# Patient Record
Sex: MALE | Race: WHITE | URBAN - METROPOLITAN AREA
[De-identification: names, ages, dates, MRNs, and addresses within clinical notes are randomized per-mention and may not be internally consistent; named-entity substitution may affect disease eponyms.]

---

## 2017-08-28 ENCOUNTER — APPOINTMENT (OUTPATIENT)
Dept: OTHER | Facility: HOSPITAL | Age: 22
End: 2017-08-28
Attending: FAMILY MEDICINE

## 2024-05-10 ENCOUNTER — OFFICE VISIT (OUTPATIENT)
Dept: INTERNAL MEDICINE CLINIC | Facility: CLINIC | Age: 29
End: 2024-05-10

## 2024-05-10 VITALS
DIASTOLIC BLOOD PRESSURE: 86 MMHG | SYSTOLIC BLOOD PRESSURE: 128 MMHG | WEIGHT: 225 LBS | RESPIRATION RATE: 18 BRPM | OXYGEN SATURATION: 98 % | HEART RATE: 76 BPM | TEMPERATURE: 98 F | BODY MASS INDEX: 32.21 KG/M2 | HEIGHT: 70 IN

## 2024-05-10 DIAGNOSIS — M25.532 LEFT WRIST PAIN: ICD-10-CM

## 2024-05-10 DIAGNOSIS — Z00.00 ANNUAL PHYSICAL EXAM: Primary | ICD-10-CM

## 2024-05-10 DIAGNOSIS — K64.9 HEMORRHOIDS, UNSPECIFIED HEMORRHOID TYPE: ICD-10-CM

## 2024-05-10 DIAGNOSIS — Z30.09 VASECTOMY EVALUATION: ICD-10-CM

## 2024-05-10 DIAGNOSIS — L98.9 SKIN LESIONS: ICD-10-CM

## 2024-05-10 LAB
ALBUMIN SERPL-MCNC: 5 G/DL (ref 3.2–4.8)
ALBUMIN/GLOB SERPL: 1.5 {RATIO} (ref 1–2)
ALP LIVER SERPL-CCNC: 113 U/L
ALT SERPL-CCNC: 41 U/L
ANION GAP SERPL CALC-SCNC: 8 MMOL/L (ref 0–18)
AST SERPL-CCNC: 27 U/L (ref ?–34)
BASOPHILS # BLD AUTO: 0.02 X10(3) UL (ref 0–0.2)
BASOPHILS NFR BLD AUTO: 0.4 %
BILIRUB SERPL-MCNC: 0.5 MG/DL (ref 0.3–1.2)
BILIRUB UR QL: NEGATIVE
BUN BLD-MCNC: 20 MG/DL (ref 9–23)
BUN/CREAT SERPL: 18.3 (ref 10–20)
CALCIUM BLD-MCNC: 10.2 MG/DL (ref 8.7–10.4)
CHLORIDE SERPL-SCNC: 107 MMOL/L (ref 98–112)
CHOLEST SERPL-MCNC: 214 MG/DL (ref ?–200)
CLARITY UR: CLEAR
CO2 SERPL-SCNC: 26 MMOL/L (ref 21–32)
CREAT BLD-MCNC: 1.09 MG/DL
DEPRECATED RDW RBC AUTO: 38.7 FL (ref 35.1–46.3)
EGFRCR SERPLBLD CKD-EPI 2021: 93 ML/MIN/1.73M2 (ref 60–?)
EOSINOPHIL # BLD AUTO: 0.04 X10(3) UL (ref 0–0.7)
EOSINOPHIL NFR BLD AUTO: 0.9 %
ERYTHROCYTE [DISTWIDTH] IN BLOOD BY AUTOMATED COUNT: 12.7 % (ref 11–15)
FASTING PATIENT LIPID ANSWER: YES
FASTING STATUS PATIENT QL REPORTED: YES
GLOBULIN PLAS-MCNC: 3.4 G/DL (ref 2–3.5)
GLUCOSE BLD-MCNC: 87 MG/DL (ref 70–99)
GLUCOSE UR-MCNC: NORMAL MG/DL
HCT VFR BLD AUTO: 44.3 %
HDLC SERPL-MCNC: 51 MG/DL (ref 40–59)
HGB BLD-MCNC: 14.9 G/DL
HGB UR QL STRIP.AUTO: NEGATIVE
IMM GRANULOCYTES # BLD AUTO: 0.01 X10(3) UL (ref 0–1)
IMM GRANULOCYTES NFR BLD: 0.2 %
KETONES UR-MCNC: NEGATIVE MG/DL
LDLC SERPL CALC-MCNC: 145 MG/DL (ref ?–100)
LEUKOCYTE ESTERASE UR QL STRIP.AUTO: 500
LYMPHOCYTES # BLD AUTO: 2.04 X10(3) UL (ref 1–4)
LYMPHOCYTES NFR BLD AUTO: 43.6 %
MCH RBC QN AUTO: 28.3 PG (ref 26–34)
MCHC RBC AUTO-ENTMCNC: 33.6 G/DL (ref 31–37)
MCV RBC AUTO: 84.1 FL
MONOCYTES # BLD AUTO: 0.31 X10(3) UL (ref 0.1–1)
MONOCYTES NFR BLD AUTO: 6.6 %
NEUTROPHILS # BLD AUTO: 2.26 X10 (3) UL (ref 1.5–7.7)
NEUTROPHILS # BLD AUTO: 2.26 X10(3) UL (ref 1.5–7.7)
NEUTROPHILS NFR BLD AUTO: 48.3 %
NITRITE UR QL STRIP.AUTO: NEGATIVE
NONHDLC SERPL-MCNC: 163 MG/DL (ref ?–130)
OSMOLALITY SERPL CALC.SUM OF ELEC: 294 MOSM/KG (ref 275–295)
PH UR: 5.5 [PH] (ref 5–8)
PLATELET # BLD AUTO: 277 10(3)UL (ref 150–450)
POTASSIUM SERPL-SCNC: 3.9 MMOL/L (ref 3.5–5.1)
PROT SERPL-MCNC: 8.4 G/DL (ref 5.7–8.2)
PROT UR-MCNC: NEGATIVE MG/DL
RBC # BLD AUTO: 5.27 X10(6)UL
SODIUM SERPL-SCNC: 141 MMOL/L (ref 136–145)
SP GR UR STRIP: 1.02 (ref 1–1.03)
TRIGL SERPL-MCNC: 102 MG/DL (ref 30–149)
TSI SER-ACNC: 1.53 MIU/ML (ref 0.55–4.78)
UROBILINOGEN UR STRIP-ACNC: NORMAL
VLDLC SERPL CALC-MCNC: 19 MG/DL (ref 0–30)
WBC # BLD AUTO: 4.7 X10(3) UL (ref 4–11)

## 2024-05-10 PROCEDURE — 90715 TDAP VACCINE 7 YRS/> IM: CPT | Performed by: INTERNAL MEDICINE

## 2024-05-10 PROCEDURE — 36415 COLL VENOUS BLD VENIPUNCTURE: CPT | Performed by: INTERNAL MEDICINE

## 2024-05-10 PROCEDURE — 99395 PREV VISIT EST AGE 18-39: CPT | Performed by: INTERNAL MEDICINE

## 2024-05-10 PROCEDURE — 90471 IMMUNIZATION ADMIN: CPT | Performed by: INTERNAL MEDICINE

## 2024-05-10 NOTE — PROGRESS NOTES
Subjective:     Patient ID: Preston Casey is a 29 year old male.    HPI    Patient comes in first time to establish care he is complaining today of left wrist pain he recently noticed this lump on the top of the wrist pain is on and off patient also with complaint of having skin lesions wants them checked.  Patient also with history of hemorrhoids with flareup on and off pain bleeding he has tried over-the-counter stuff but does not help too much patient also wants to see a urologist for vasectomy      History/Other:   Review of Systems   Constitutional: Negative.    HENT: Negative.     Eyes: Negative.    Respiratory: Negative.     Cardiovascular: Negative.    Gastrointestinal: Negative.         Hemorroids    Genitourinary: Negative.    Musculoskeletal: Negative.         Wrist pain , lump   Skin: Negative.         Skin lesions    Neurological: Negative.    Psychiatric/Behavioral: Negative.       No current outpatient medications on file.     Allergies:No Known Allergies    No past medical history on file.   History reviewed. No pertinent surgical history.   Family History   Problem Relation Age of Onset    Cancer Mother     No Known Problems Father       Social History:   Social History     Socioeconomic History    Marital status: Single   Tobacco Use    Smoking status: Never     Passive exposure: Never    Smokeless tobacco: Never   Vaping Use    Vaping status: Never Used   Substance and Sexual Activity    Alcohol use: Never    Drug use: Never        Objective:   Physical Exam  Vitals and nursing note reviewed.   Constitutional:       Appearance: He is well-developed.   HENT:      Head: Normocephalic and atraumatic.      Right Ear: External ear normal.      Left Ear: External ear normal.      Nose: Nose normal.   Eyes:      Conjunctiva/sclera: Conjunctivae normal.      Pupils: Pupils are equal, round, and reactive to light.   Cardiovascular:      Rate and Rhythm: Normal rate and regular rhythm.      Heart sounds:  Normal heart sounds.   Pulmonary:      Effort: Pulmonary effort is normal.      Breath sounds: Normal breath sounds.   Abdominal:      General: Bowel sounds are normal.      Palpations: Abdomen is soft.   Genitourinary:     Penis: Normal.       Prostate: Normal.      Rectum: Normal.   Musculoskeletal:         General: Swelling and tenderness present. Normal range of motion.      Cervical back: Normal range of motion and neck supple.      Comments: Wrist pain , lump   Skin:     General: Skin is warm and dry.   Neurological:      Mental Status: He is alert and oriented to person, place, and time.      Deep Tendon Reflexes: Reflexes are normal and symmetric.         Assessment & Plan:   1. Annual physical exam exam as above we will order labs   2. Skin lesions will refer to dermatology   3. Hemorrhoids, unspecified hemorrhoid type will refer to surgeon    4. Left wrist pain most likely a cyst will order Nutracel referred to 1 specialist    5. Vasectomy evaluation will refer to urology       Orders Placed This Encounter   Procedures    CBC With Differential With Platelet    Comp Metabolic Panel (14)    Lipid Panel    TSH W Reflex To Free T4    Urinalysis, Routine    TETANUS, DIPHTHERIA TOXOIDS AND ACELLULAR PERTUSIS VACCINE (TDAP), >7 YEARS, IM USE       Meds This Visit:  Requested Prescriptions      No prescriptions requested or ordered in this encounter       Imaging & Referrals:  TETANUS, DIPHTHERIA TOXOIDS AND ACELLULAR PERTUSIS VACCINE (TDAP), >7 YEARS, IM USE  SURGERY - INTERNAL  DERM - INTERNAL  UROLOGY - INTERNAL  PLASTIC SURGERY - INTERNAL  US WRIST LEFT LIMITED (CPT=76882)

## 2024-05-22 ENCOUNTER — OFFICE VISIT (OUTPATIENT)
Dept: SURGERY | Facility: CLINIC | Age: 29
End: 2024-05-22

## 2024-05-22 VITALS — BODY MASS INDEX: 31.78 KG/M2 | WEIGHT: 222 LBS | HEIGHT: 70 IN

## 2024-05-22 DIAGNOSIS — K64.8 OTHER HEMORRHOIDS: Primary | ICD-10-CM

## 2024-05-22 DIAGNOSIS — K60.3 ANAL FISTULA: ICD-10-CM

## 2024-05-22 PROCEDURE — 46600 DIAGNOSTIC ANOSCOPY SPX: CPT | Performed by: SURGERY

## 2024-05-22 PROCEDURE — 99203 OFFICE O/P NEW LOW 30 MIN: CPT | Performed by: SURGERY

## 2024-05-22 NOTE — H&P
History and Physical      HPI     Chief Complaint   Patient presents with    Referral     Referral from Dr. Chandana Quezada for Hemorrhoids.  Patient reports intermittent bleeding after BM's.  Patient has BM's every day.         HPI  Preston Casey is a 29 year old male who presents with intermittent hemorrhoids for many years.  Initially they were painful but now more irritation.  He does have intermittent bleeding.  Wants to get them checked out.    History reviewed. No pertinent past medical history.  History reviewed. No pertinent surgical history.  No current outpatient medications on file.     ALLERGIES  No Known Allergies    Social History     Socioeconomic History    Marital status: Single    Number of children: 0   Tobacco Use    Smoking status: Never     Passive exposure: Never    Smokeless tobacco: Never   Vaping Use    Vaping status: Never Used   Substance and Sexual Activity    Alcohol use: Never    Drug use: Never     Family History   Problem Relation Age of Onset    Cancer Mother     No Known Problems Father        Review of Systems   A comprehensive 10 point review of systems was completed.  Pertinent positives and negatives noted in the the HPI.    PHYSICAL EXAM   Ht 5' 10\" (1.778 m)   Wt 222 lb (100.7 kg)   BMI 31.85 kg/m²  No LMP for male patient.   Constitutional: appears well hydrated alert and responsive no acute distress noted  Head/Face: normocephalic  Nose/Mouth/Throat: nose and throat are clear palate is intact mucous membranes are moist no oral lesions are noted  Neck/Thyroid: neck is supple without adenopathy  Respiratory: normal to inspection lungs are clear to auscultation bilaterally normal respiratory effort  Cardiovascular: regular rate and rhythm no murmurs, gallups, or rubs  Abdomen: soft non-tender non-distended no organomegaly noted no masses  Extremities: no edema, cyanosis, or clubbing  Neurological: exam appropriate for age reflexes and motor skills appropriate for age    Anal  exam-digital anoscopic exam is performed.  Fistula identified at the 7 o'clock position in prone.  Grade 1 2 nonbleeding internal hemorrhoids  ASSESSMENT/PLAN   Assessment   Encounter Diagnosis   Name Primary?    Other hemorrhoids Yes       29 year old male with anal fistula  We have discussed the surgical risks, benefits, alternatives, and expected recovery. We will plan exam under anesthesia, anal fistulotomy possible seton.  Possible banding internal hemorrhoids. All of the patient's questions have been answered to his satisfaction.       5/22/2024  Alvin Au MD

## 2024-05-30 ENCOUNTER — OFFICE VISIT (OUTPATIENT)
Dept: DERMATOLOGY CLINIC | Facility: CLINIC | Age: 29
End: 2024-05-30

## 2024-05-30 DIAGNOSIS — D22.9 MULTIPLE BENIGN NEVI: ICD-10-CM

## 2024-05-30 DIAGNOSIS — D49.2 NEOPLASM OF UNSPECIFIED BEHAVIOR OF BONE, SOFT TISSUE, AND SKIN: Primary | ICD-10-CM

## 2024-05-30 PROCEDURE — 88305 TISSUE EXAM BY PATHOLOGIST: CPT | Performed by: STUDENT IN AN ORGANIZED HEALTH CARE EDUCATION/TRAINING PROGRAM

## 2024-05-30 PROCEDURE — 99203 OFFICE O/P NEW LOW 30 MIN: CPT | Performed by: STUDENT IN AN ORGANIZED HEALTH CARE EDUCATION/TRAINING PROGRAM

## 2024-05-30 PROCEDURE — 88304 TISSUE EXAM BY PATHOLOGIST: CPT | Performed by: STUDENT IN AN ORGANIZED HEALTH CARE EDUCATION/TRAINING PROGRAM

## 2024-05-30 PROCEDURE — 11103 TANGNTL BX SKIN EA SEP/ADDL: CPT | Performed by: STUDENT IN AN ORGANIZED HEALTH CARE EDUCATION/TRAINING PROGRAM

## 2024-05-30 PROCEDURE — 11102 TANGNTL BX SKIN SINGLE LES: CPT | Performed by: STUDENT IN AN ORGANIZED HEALTH CARE EDUCATION/TRAINING PROGRAM

## 2024-05-30 NOTE — PROGRESS NOTES
New Patient    Referred by: No referring provider defined for this encounter.    CHIEF COMPLAINT: Lesion of concern     HISTORY OF PRESENT ILLNESS: Preston Casey is a 29 year old male here for evaluation of lesion of concern.    1. Skin Tags   Location: middle of back and back left shoulder  Duration: Many yrs  Signs and symptoms: NONE  Current treatment: NONE    Personal Dermatologic History  History of skin cancer: No  History of  atypical moles: No    FAMILY HISTORY:  History of melanoma: No    Past Medical History  No past medical history on file.    REVIEW OF SYSTEMS:  Constitutional: Denies fever, chills, unintentional weight loss.   Skin as per HPI    Medications  No current outpatient medications on file.       PHYSICAL EXAM:  General: awake, alert, no acute distress  Neuropsych: appropriate mood and affect  Eyes: Sclerae anicteric, without conjunctival injection, eyelids unremarkable  Skin: Skin exam was performed today including the following: back. Pertinent findings include:   - with loni pink brown pedunculated papules  - with numerous regular appearing brown macules and papules    ASSESSMENT & PLAN:  Pathophysiology of diagnoses discussed with patient.  Therapeutic options reviewed. Risks, benefits, and alternatives discussed with patient. Instructions reviewed at length.    #Multiple benign nevi  - Reassured patient of benign nature of these lesions.   - Return for lesions that are new, growing, changing or symptomatic.   - Recommend daily photoprotection with broad-spectrum sunscreen (SPF 30 daily with reapplication every 2 hours), avoidance of sun during peak hours, and sun protective clothing.   - Dermoscopy was used for physical examination of pigmented lesions during today's office visit.    #Neoplasm(s) of uncertain behavior of skin  - Shave biopsy performed today   - Will notify patient with results and arrange for appropriate definitive treatment, if indicated.      Shave of lesion to  establish and confirm diagnosis:  Photo taken: Yes    Risks, benefits, alternatives and personnel required for shave biopsy reviewed with patient. Risks discussed include, but not limited to: pain, bleeding, infection, scar, reaction to anesthetic, and recurrence/need for further treatment.  Patient and physician agree as to site(s) to be biopsied. Patient verbalizes understanding and wishes to proceed.     Site(s) prepped with alcohol and anesthetized with 1% lidocaine with epinephrine.   Shave of lesion(s) performed to the level of the dermis. Specimen(s) from A.L upper back, B. L mid back  sent for pathology to r/o Nevus 50% ALCL and bandaging applied.   Written and verbal wound care instructions provided to patient, understanding verbalized.      Return to clinic: as needed or sooner if something concerning arises     Rodney Aldridge MD

## 2024-06-10 NOTE — DISCHARGE INSTRUCTIONS
Ice pack as needed.  Remove anal packing and start warm baths tomorrow  Ibuprofen or Norco for pain  Colace 100 mg twice a day as a stool softener  Clean gauze or sanitary pad for drainage  No heavy lifting for 1 week.  Follow-up with Justin in 2 weeks for wound check.  See Dr. Au in 6 weeks.  Return to work as tolerated    HOME INSTRUCTIONS  [facility] HOME CARE INSTRUCTIONS: POST-OP ANESTHESIA  The medication that you received for sedation or general anesthesia can last up to 24 hours. Your judgment and reflexes may be altered, even if you feel like your normal self.      We Recommend:   Do not drive any motor vehicle or bicycle   Avoid mowing the lawn, playing sports, or working with power tools/applicances (power saws, electric knives or mixers)   That you have someone stay with you on your first night home   Do not drink alcohol or take sleeping pills or tranquilizers   Do not sign legal documents within 24 hours of your procedure   If you had a nerve block for your surgery, take extra care not to put any pressure on your arm or hand for 24 hours    It is normal:  For you to have a sore throat if you had a breathing tube during surgery (while you were asleep!). The sore throat should get better within 48 hours. You can gargle with warm salt water (1/2 tsp in 4 oz warm water) or use a throat lozenge for comfort  To feel muscle aches or soreness especially in the abdomen, chest or neck. The achy feeling should go away in the next 24 hours  To feel weak, sleepy or \"wiped out\". Your should start feeling better in the next 24 hours.   To experience mild discomforts such as sore lip or tongue, headache, cramps, gas pains or a bloated feeling in your abdomen.   To experience mild back pain or soreness for a day or two if you had spinal or epidural anesthesia.   If you had laparoscopic surgery, to feel shoulder pain or discomfort on the day of surgery.   For some patients to have nausea after surgery/anesthesia    If you  feel nausea or experience vomiting:   Try to move around less.   Eat less than usual or drink only liquids until the next morning   Nausea should resolve in about 24 hours    If you have a problem when you are at home:    Call your surgeons office   Discharge Instructions: After Your Surgery  You’ve just had surgery. During surgery, you were given medicine called anesthesia to keep you relaxed and free of pain. After surgery, you may have some pain or nausea. This is common. Here are some tips for feeling better and getting well after surgery.   Going home  Your healthcare provider will show you how to take care of yourself when you go home. They'll also answer your questions. Have an adult family member or friend drive you home. For the first 24 hours after your surgery:   Don't drive or use heavy equipment.  Don't make important decisions or sign legal papers.  Take medicines as directed.  Don't drink alcohol.  Have someone stay with you, if needed. They can watch for problems and help keep you safe.  Be sure to go to all follow-up visits with your healthcare provider. And rest after your surgery for as long as your provider tells you to.   Coping with pain  If you have pain after surgery, pain medicine will help you feel better. Take it as directed, before pain becomes severe. Also, ask your healthcare provider or pharmacist about other ways to control pain. This might be with heat, ice, or relaxation. And follow any other instructions your surgeon or nurse gives you.      Stay on schedule with your medicine.     Tips for taking pain medicine  To get the best relief possible, remember these points:   Pain medicines can upset your stomach. Taking them with a little food may help.  Most pain relievers taken by mouth need at least 20 to 30 minutes to start to work.  Don't wait till your pain becomes severe before you take your medicine. Try to time your medicine so that you can take it before starting an activity.  This might be before you get dressed, go for a walk, or sit down for dinner.  Constipation is a common side effect of some pain medicines. Call your healthcare provider before taking any medicines such as laxatives or stool softeners to help ease constipation. Also ask if you should skip any foods. Drinking lots of fluids and eating foods such as fruits and vegetables that are high in fiber can also help. Remember, don't take laxatives unless your surgeon has prescribed them.  Drinking alcohol and taking pain medicine can cause dizziness and slow your breathing. It can even be deadly. Don't drink alcohol while taking pain medicine.  Pain medicine can make you react more slowly to things. Don't drive or run machinery while taking pain medicine.  Your healthcare provider may tell you to take acetaminophen to help ease your pain. Ask them how much you're supposed to take each day. Acetaminophen or other pain relievers may interact with your prescription medicines or other over-the-counter (OTC) medicines. Some prescription medicines have acetaminophen and other ingredients in them. Using both prescription and OTC acetaminophen for pain can cause you to accidentally overdose. Read the labels on your OTC medicines with care. This will help you to clearly know the list of ingredients, how much to take, and any warnings. It may also help you not take too much acetaminophen. If you have questions or don't understand the information, ask your pharmacist or healthcare provider to explain it to you before you take the OTC medicine.   Managing nausea  Some people have an upset stomach (nausea) after surgery. This is often because of anesthesia, pain, or pain medicine, less movement of food in the stomach, or the stress of surgery. These tips will help you handle nausea and eat healthy foods as you get better. If you were on a special food plan before surgery, ask your healthcare provider if you should follow it while you get  better. Check with your provider on how your eating should progress. It may depend on the surgery you had. These general tips may help:   Don't push yourself to eat. Your body will tell you when to eat and how much.  Start off with clear liquids and soup. They're easier to digest.  Next try semi-solid foods as you feel ready. These include mashed potatoes, applesauce, and gelatin.  Slowly move to solid foods. Don’t eat fatty, rich, or spicy foods at first.  Don't force yourself to have 3 large meals a day. Instead eat smaller amounts more often.  Take pain medicines with a small amount of solid food, such as crackers or toast. This helps prevent nausea.  When to call your healthcare provider  Call your healthcare provider right away if you have any of these:   You still have too much pain, or the pain gets worse, after taking the medicine. The medicine may not be strong enough. Or there may be a complication from the surgery.  You feel too sleepy, dizzy, or groggy. The medicine may be too strong.  Side effects such as nausea or vomiting. Your healthcare provider may advise taking other medicines to .  Skin changes such as rash, itching, or hives. This may mean you have an allergic reaction. Your provider may advise taking other medicines.  The incision looks different (for instance, part of it opens up).  Bleeding or fluid leaking from the incision site, and weren't told to expect that.  Fever of 100.4°F (38°C) or higher, or as directed by your provider.  Call 911  Call 911 right away if you have:   Trouble breathing  Facial swelling    If you have obstructive sleep apnea   You were given anesthesia medicine during surgery to keep you comfortable and free of pain. After surgery, you may have more apnea spells because of this medicine and other medicines you were given. The spells may last longer than normal.    At home:  Keep using the continuous positive airway pressure (CPAP) device when you sleep. Unless your  healthcare provider tells you not to, use it when you sleep, day or night. CPAP is a common device used to treat obstructive sleep apnea.  Talk with your provider before taking any pain medicine, muscle relaxants, or sedatives. Your provider will tell you about the possible dangers of taking these medicines.  Contact your provider if your sleeping changes a lot even when taking medicines as directed.  [employer] last reviewed this educational content on 10/1/2021  © 2137-9018 [employer]. All rights reserved. This information is not intended as a substitute for professional medical care. Always follow your healthcare professional's instructions.

## 2024-06-11 ENCOUNTER — HOSPITAL ENCOUNTER (OUTPATIENT)
Facility: HOSPITAL | Age: 29
Setting detail: HOSPITAL OUTPATIENT SURGERY
Discharge: HOME | End: 2024-06-11
Attending: SURGERY | Admitting: SURGERY
Payer: COMMERCIAL

## 2024-06-11 ENCOUNTER — ANESTHESIA (OUTPATIENT)
Dept: SURGERY | Facility: HOSPITAL | Age: 29
End: 2024-06-11
Payer: COMMERCIAL

## 2024-06-11 ENCOUNTER — ANESTHESIA EVENT (OUTPATIENT)
Dept: SURGERY | Facility: HOSPITAL | Age: 29
End: 2024-06-11
Payer: COMMERCIAL

## 2024-06-11 VITALS
RESPIRATION RATE: 16 BRPM | TEMPERATURE: 98 F | DIASTOLIC BLOOD PRESSURE: 89 MMHG | OXYGEN SATURATION: 93 % | WEIGHT: 226 LBS | HEIGHT: 70 IN | SYSTOLIC BLOOD PRESSURE: 130 MMHG | HEART RATE: 84 BPM | BODY MASS INDEX: 32.35 KG/M2

## 2024-06-11 DIAGNOSIS — G89.18 POSTOPERATIVE PAIN: Primary | ICD-10-CM

## 2024-06-11 PROCEDURE — 06LY7CC OCCLUSION OF HEMORRHOIDAL PLEXUS WITH EXTRALUMINAL DEVICE, VIA NATURAL OR ARTIFICIAL OPENING: ICD-10-PCS | Performed by: SURGERY

## 2024-06-11 PROCEDURE — 46270 REMOVE ANAL FIST SUBQ: CPT | Performed by: SURGERY

## 2024-06-11 PROCEDURE — 0DQQ7ZZ REPAIR ANUS, VIA NATURAL OR ARTIFICIAL OPENING: ICD-10-PCS | Performed by: SURGERY

## 2024-06-11 PROCEDURE — 46221 LIGATION OF HEMORRHOID(S): CPT | Performed by: SURGERY

## 2024-06-11 PROCEDURE — 0H89XZZ DIVISION OF PERINEUM SKIN, EXTERNAL APPROACH: ICD-10-PCS | Performed by: SURGERY

## 2024-06-11 RX ORDER — FAMOTIDINE 10 MG/ML
20 INJECTION, SOLUTION INTRAVENOUS ONCE
Status: COMPLETED | OUTPATIENT
Start: 2024-06-11 | End: 2024-06-11

## 2024-06-11 RX ORDER — HYDROMORPHONE HYDROCHLORIDE 1 MG/ML
0.4 INJECTION, SOLUTION INTRAMUSCULAR; INTRAVENOUS; SUBCUTANEOUS EVERY 5 MIN PRN
Status: DISCONTINUED | OUTPATIENT
Start: 2024-06-11 | End: 2024-06-11

## 2024-06-11 RX ORDER — IBUPROFEN 600 MG/1
600 TABLET ORAL EVERY 6 HOURS PRN
Qty: 15 TABLET | Refills: 1 | Status: SHIPPED | OUTPATIENT
Start: 2024-06-11 | End: 2024-06-18

## 2024-06-11 RX ORDER — NALOXONE HYDROCHLORIDE 0.4 MG/ML
0.08 INJECTION, SOLUTION INTRAMUSCULAR; INTRAVENOUS; SUBCUTANEOUS AS NEEDED
Status: DISCONTINUED | OUTPATIENT
Start: 2024-06-11 | End: 2024-06-11

## 2024-06-11 RX ORDER — ACETAMINOPHEN 500 MG
1000 TABLET ORAL ONCE AS NEEDED
Status: DISCONTINUED | OUTPATIENT
Start: 2024-06-11 | End: 2024-06-11

## 2024-06-11 RX ORDER — METRONIDAZOLE 500 MG/100ML
500 INJECTION, SOLUTION INTRAVENOUS ONCE
Status: COMPLETED | OUTPATIENT
Start: 2024-06-11 | End: 2024-06-11

## 2024-06-11 RX ORDER — KETOROLAC TROMETHAMINE 30 MG/ML
INJECTION, SOLUTION INTRAMUSCULAR; INTRAVENOUS AS NEEDED
Status: DISCONTINUED | OUTPATIENT
Start: 2024-06-11 | End: 2024-06-11 | Stop reason: SURG

## 2024-06-11 RX ORDER — HYDROMORPHONE HYDROCHLORIDE 1 MG/ML
0.2 INJECTION, SOLUTION INTRAMUSCULAR; INTRAVENOUS; SUBCUTANEOUS EVERY 5 MIN PRN
Status: DISCONTINUED | OUTPATIENT
Start: 2024-06-11 | End: 2024-06-11

## 2024-06-11 RX ORDER — ONDANSETRON 2 MG/ML
4 INJECTION INTRAMUSCULAR; INTRAVENOUS EVERY 6 HOURS PRN
Status: DISCONTINUED | OUTPATIENT
Start: 2024-06-11 | End: 2024-06-11

## 2024-06-11 RX ORDER — PROCHLORPERAZINE EDISYLATE 5 MG/ML
5 INJECTION INTRAMUSCULAR; INTRAVENOUS EVERY 8 HOURS PRN
Status: DISCONTINUED | OUTPATIENT
Start: 2024-06-11 | End: 2024-06-11

## 2024-06-11 RX ORDER — FAMOTIDINE 20 MG/1
20 TABLET, FILM COATED ORAL ONCE
Status: COMPLETED | OUTPATIENT
Start: 2024-06-11 | End: 2024-06-11

## 2024-06-11 RX ORDER — ROCURONIUM BROMIDE 10 MG/ML
INJECTION, SOLUTION INTRAVENOUS AS NEEDED
Status: DISCONTINUED | OUTPATIENT
Start: 2024-06-11 | End: 2024-06-11 | Stop reason: SURG

## 2024-06-11 RX ORDER — METOCLOPRAMIDE 10 MG/1
10 TABLET ORAL ONCE
Status: COMPLETED | OUTPATIENT
Start: 2024-06-11 | End: 2024-06-11

## 2024-06-11 RX ORDER — BUPIVACAINE HYDROCHLORIDE AND EPINEPHRINE 2.5; 5 MG/ML; UG/ML
INJECTION, SOLUTION INFILTRATION; PERINEURAL AS NEEDED
Status: DISCONTINUED | OUTPATIENT
Start: 2024-06-11 | End: 2024-06-11 | Stop reason: HOSPADM

## 2024-06-11 RX ORDER — LIDOCAINE HYDROCHLORIDE 10 MG/ML
INJECTION, SOLUTION EPIDURAL; INFILTRATION; INTRACAUDAL; PERINEURAL AS NEEDED
Status: DISCONTINUED | OUTPATIENT
Start: 2024-06-11 | End: 2024-06-11 | Stop reason: SURG

## 2024-06-11 RX ORDER — ACETAMINOPHEN 500 MG
1000 TABLET ORAL ONCE
Status: COMPLETED | OUTPATIENT
Start: 2024-06-11 | End: 2024-06-11

## 2024-06-11 RX ORDER — DIBUCAINE 0.28 G/28G
OINTMENT TOPICAL AS NEEDED
Status: DISCONTINUED | OUTPATIENT
Start: 2024-06-11 | End: 2024-06-11 | Stop reason: HOSPADM

## 2024-06-11 RX ORDER — SODIUM CHLORIDE, SODIUM LACTATE, POTASSIUM CHLORIDE, CALCIUM CHLORIDE 600; 310; 30; 20 MG/100ML; MG/100ML; MG/100ML; MG/100ML
INJECTION, SOLUTION INTRAVENOUS CONTINUOUS
Status: DISCONTINUED | OUTPATIENT
Start: 2024-06-11 | End: 2024-06-11

## 2024-06-11 RX ORDER — METOCLOPRAMIDE HYDROCHLORIDE 5 MG/ML
10 INJECTION INTRAMUSCULAR; INTRAVENOUS ONCE
Status: COMPLETED | OUTPATIENT
Start: 2024-06-11 | End: 2024-06-11

## 2024-06-11 RX ORDER — ONDANSETRON 2 MG/ML
INJECTION INTRAMUSCULAR; INTRAVENOUS AS NEEDED
Status: DISCONTINUED | OUTPATIENT
Start: 2024-06-11 | End: 2024-06-11 | Stop reason: SURG

## 2024-06-11 RX ORDER — DOCUSATE SODIUM 100 MG/1
100 CAPSULE, LIQUID FILLED ORAL 2 TIMES DAILY
Qty: 30 CAPSULE | Refills: 0 | Status: SHIPPED | OUTPATIENT
Start: 2024-06-11

## 2024-06-11 RX ORDER — DEXAMETHASONE SODIUM PHOSPHATE 4 MG/ML
VIAL (ML) INJECTION AS NEEDED
Status: DISCONTINUED | OUTPATIENT
Start: 2024-06-11 | End: 2024-06-11 | Stop reason: SURG

## 2024-06-11 RX ORDER — HYDROMORPHONE HYDROCHLORIDE 1 MG/ML
0.6 INJECTION, SOLUTION INTRAMUSCULAR; INTRAVENOUS; SUBCUTANEOUS EVERY 5 MIN PRN
Status: DISCONTINUED | OUTPATIENT
Start: 2024-06-11 | End: 2024-06-11

## 2024-06-11 RX ORDER — HYDROCODONE BITARTRATE AND ACETAMINOPHEN 5; 325 MG/1; MG/1
1 TABLET ORAL EVERY 6 HOURS PRN
Qty: 20 TABLET | Refills: 0 | Status: SHIPPED | OUTPATIENT
Start: 2024-06-11

## 2024-06-11 RX ADMIN — KETOROLAC TROMETHAMINE 30 MG: 30 INJECTION, SOLUTION INTRAMUSCULAR; INTRAVENOUS at 10:39:00

## 2024-06-11 RX ADMIN — LIDOCAINE HYDROCHLORIDE 100 MG: 10 INJECTION, SOLUTION EPIDURAL; INFILTRATION; INTRACAUDAL; PERINEURAL at 10:17:00

## 2024-06-11 RX ADMIN — METRONIDAZOLE 500 MG: 500 INJECTION, SOLUTION INTRAVENOUS at 10:22:00

## 2024-06-11 RX ADMIN — ROCURONIUM BROMIDE 40 MG: 10 INJECTION, SOLUTION INTRAVENOUS at 10:18:00

## 2024-06-11 RX ADMIN — ONDANSETRON 4 MG: 2 INJECTION INTRAMUSCULAR; INTRAVENOUS at 10:39:00

## 2024-06-11 RX ADMIN — DEXAMETHASONE SODIUM PHOSPHATE 4 MG: 4 MG/ML VIAL (ML) INJECTION at 10:22:00

## 2024-06-11 NOTE — ANESTHESIA POSTPROCEDURE EVALUATION
Patient: Preston Casey    Procedure Summary       Date: 06/11/24 Room / Location: [facility] MAIN OR [address] [place] [address] MAIN OR    Anesthesia Start: 1013 Anesthesia Stop: 1051    Procedure: Examination under anesthesia, anal fistulotomy, seton hemorrhoid banding. (Anus) Diagnosis:       Other hemorrhoids      Anal fistula      (Other hemorrhoids [K64.8]Anal fistula [K60.3])    Surgeons: Alvin Au MD Anesthesiologist: Jerod Chavis MD    Anesthesia Type: general ASA Status: 2            Anesthesia Type: general    Vitals Value Taken Time   /89 06/11/24 1132   Temp 97.8 °F (36.6 °C) 06/11/24 1124   Pulse 84 06/11/24 1132   Resp 16 06/11/24 1132   SpO2 93 % 06/11/24 1132       [facility] AN Post Evaluation:   Patient Evaluated in PACU  Patient Participation: complete - patient participated  Level of Consciousness: awake and alert  Pain Score: 4  Pain Management: satisfactory to patient  Airway Patency:  Dental exam unchanged from preop  Yes    Nausea/Vomiting: none  Cardiovascular Status: blood pressure returned to baseline and hemodynamically stable  Respiratory Status: acceptable, nasal cannula, nonlabored ventilation and unassisted  Postoperative Hydration acceptable      Jerod Chavis MD  6/11/2024 12:00 PM

## 2024-06-11 NOTE — OPERATIVE REPORT
[facility]    PATIENT'S NAME: TAYLOR OHARA   ATTENDING PHYSICIAN: Alvin Au MD   OPERATING PHYSICIAN: Alvin Au MD   PATIENT ACCOUNT#:   [id number]    LOCATION:  [facility] 10  MEDICAL RECORD #:   [id number]       YOB: 1995  ADMISSION DATE:       06/11/2024      OPERATION DATE:  06/11/2024    OPERATIVE REPORT      PREOPERATIVE DIAGNOSIS:  Internal hemorrhoids, grade 2; intersphincteric anal fistula.  POSTOPERATIVE DIAGNOSIS:  Internal hemorrhoids, grade 2; intersphincteric anal fistula.  PROCEDURE:  Exam under anesthesia, anoscopy, fistulogram, first-stage anal fistulotomy, and seton placement; banding internal hemorrhoid x2.     ASSISTANT:  EMIR Delacruz.    ANESTHESIA:  General.    ESTIMATED BLOOD LOSS:  Minimal.    COMPLICATIONS:  None.    DISPOSITION:  To Recovery, tolerated well.    INDICATIONS:  Patient is 29 with the above complaint.  Consent obtained.    OPERATIVE TECHNIQUE:  He was taken to surgery.  He was prepped and draped in the usual sterile fashion.  The anoscope is inserted.  Noncomplicated grade 2 internal hemorrhoids identified.  There is an external orifice at 2 o'clock in the lithotomy position.  This is draining scant pus.  Using a 15 blade scalpel, the granulation tissue is debrided.  A fistulogram is performed and a probe is placed into the tract.  This encompasses a portion of the internal and external sphincter muscle.  Mucosa is cut as well as a first-stage fistulotomy using electrocautery; 2-0 silk suture is used as a seton.  Wound is irrigated.  Hemostasis maintained.  Marcaine infiltrated for local block.  Next, 2 internal hemorrhoids are singly banded successfully.  Dibucaine and Gelfoam are placed.  He tolerated this well.     Dictated By Alvin Au MD  d: 06/11/2024 10:47:23  t: 06/11/2024 12:53:21  Job [id number]  GL/    cc: Chandana Quezada MD

## 2024-06-11 NOTE — ANESTHESIA PROCEDURE NOTES
Airway  Date/Time: 6/11/2024 10:20 AM  Urgency: elective      General Information and Staff    Patient location during procedure: OR  Anesthesiologist: Jerod Chavis MD  Performed: anesthesiologist   Performed by: Jerod Chavis MD  Authorized by: Jerod Chavis MD      Indications and Patient Condition  Indications for airway management: anesthesia  Spontaneous ventilation: present  Sedation level: deep  Preoxygenated: yes  Patient position: sniffing  Mask difficulty assessment: 1 - vent by mask    Final Airway Details  Final airway type: endotracheal airway      Successful airway: ETT  Cuffed: yes   Successful intubation technique: direct laryngoscopy  Endotracheal tube insertion site: oral  Blade: Phylicia  Blade size: #4  ETT size (mm): 7.5    Cormack-Lehane Classification: grade IIA - partial view of glottis  Placement verified by: capnometry   Measured from: lips  ETT to lips (cm): 22  Number of attempts at approach: 1  Ventilation between attempts: none  Number of other approaches attempted: 0

## 2024-06-11 NOTE — INTERVAL H&P NOTE
Pre-op Diagnosis: Other hemorrhoids [K64.8]  Anal fistula [K60.3]    The above referenced H&P was reviewed by Alvin Au MD on 6/11/2024, the patient was examined and no significant changes have occurred in the patient's condition since the H&P was performed.  I discussed with the patient and/or legal representative the potential benefits, risks and side effects of this procedure; the likelihood of the patient achieving goals; and potential problems that might occur during recuperation.  I discussed reasonable alternatives to the procedure, including risks, benefits and side effects related to the alternatives and risks related to not receiving this procedure.  We will proceed with procedure as planned.

## 2024-06-11 NOTE — ANESTHESIA PREPROCEDURE EVALUATION
Anesthesia PreOp Note      29 year old M PMHx BMI 32; presenting for EUA, anal fistulotomy, possible banding, possible hemorrhoidectomy.    HPI:     Preston Casey is a 29 year old male who presents for preoperative consultation requested by: Alvin Au MD    Date of Surgery: 6/11/2024    Procedure(s):  Examination under anesthesia, anal fistulotomy, possible banding, possible hemorrhoidectomy  Indication: Other hemorrhoids [K64.8]  Anal fistula [K60.3]    Relevant Problems   No relevant active problems       NPO:                         History Review:  Patient Active Problem List    Diagnosis Date Noted    Acute otitis externa 09/25/2012    Impacted cerumen 09/25/2012       Past Medical History:    Depression       Past Surgical History:   Procedure Laterality Date    Patient denies any surgical history         No medications prior to admission.     No current Epic-ordered facility-administered medications on file.     No current [facility]-ordered outpatient medications on file.       No Known Allergies    Family History   Problem Relation Age of Onset    Cancer Mother     No Known Problems Father      Social History     Socioeconomic History    Marital status: Single    Number of children: 0   Tobacco Use    Smoking status: Never     Passive exposure: Never    Smokeless tobacco: Never   Vaping Use    Vaping status: Never Used   Substance and Sexual Activity    Alcohol use: Never    Drug use: Never   Other Topics Concern    Reaction to local anesthetic No    Pt has a pacemaker No    Pt has a defibrillator No       Available pre-op labs reviewed.  Lab Results   Component Value Date    WBC 4.7 05/10/2024    RBC 5.27 05/10/2024    HGB 14.9 05/10/2024    HCT 44.3 05/10/2024    MCV 84.1 05/10/2024    MCH 28.3 05/10/2024    MCHC 33.6 05/10/2024    RDW 12.7 05/10/2024    .0 05/10/2024     Lab Results   Component Value Date     05/10/2024    K 3.9 05/10/2024     05/10/2024    CO2 26.0 05/10/2024     BUN 20 05/10/2024    CREATSERUM 1.09 05/10/2024    GLU 87 05/10/2024    CA 10.2 05/10/2024          Vital Signs:  Body mass index is 32.28 kg/m².   height is 1.778 m (5' 10\") and weight is 102.1 kg (225 lb).   Vitals:    06/05/24 1629   Weight: 102.1 kg (225 lb)   Height: 1.778 m (5' 10\")        Anesthesia Evaluation      History of anesthetic complications: n o personal hx of anesthesia; denies family hx of problems with anesthesia.   Airway   Mallampati: II  TM distance: >3 FB  Neck ROM: full  Dental      Comment: Risks of oral and dental damage including but not limited to cut/bloody lips or gums, damage to or dislodgment of native teeth or prosthetics/implants discussed preoperatively, with patient expressing understanding and desire to proceed with anesthetic. Patient denies knowledge of any additional damage/disease/weakness of teeth not otherwise documented in pre-anesthetic evaluation.      Pulmonary - negative ROS and normal exam   (-) COPD, asthma, recent URI, sleep apnea  Cardiovascular - negative ROS and normal exam  Exercise tolerance: good  (-) hypertension    Neuro/Psych    (+)   depression    (-) seizures, TIA, CVA    GI/Hepatic/Renal - negative ROS   (-) GERD, liver disease, renal disease    Endo/Other - negative ROS   (-) diabetes mellitus, hypothyroidism, hyperthyroidism    Comments: BMI 32  Abdominal  - normal exam                 Anesthesia Plan:   ASA:  2  Plan:   General  Airway:  ETT  Informed Consent Plan and Risks Discussed With:  Patient      I have informed Preston Raymon Hernándezimani and/or legal guardian or family member of the nature of the anesthetic plan, benefits, risks including possible dental damage if relevant, major complications, and any alternative forms of anesthetic management.   All of the patient's questions were answered to the best of my ability. The patient desires the anesthetic management as planned.  Jerod Chavis MD  6/11/2024 6:44 AM  Present on  Admission:  **None**

## 2024-06-20 ENCOUNTER — OFFICE VISIT (OUTPATIENT)
Dept: SURGERY | Facility: CLINIC | Age: 29
End: 2024-06-20

## 2024-06-20 VITALS
HEIGHT: 70 IN | DIASTOLIC BLOOD PRESSURE: 95 MMHG | HEART RATE: 75 BPM | SYSTOLIC BLOOD PRESSURE: 138 MMHG | BODY MASS INDEX: 32.35 KG/M2 | WEIGHT: 226 LBS

## 2024-06-20 DIAGNOSIS — R31.29 MICROSCOPIC HEMATURIA: ICD-10-CM

## 2024-06-20 DIAGNOSIS — Z30.09 VASECTOMY EVALUATION: Primary | ICD-10-CM

## 2024-06-20 LAB
BILIRUB UR QL: NEGATIVE
BILIRUBIN: NEGATIVE
COLOR UR: YELLOW
GLUCOSE (URINE DIPSTICK): NEGATIVE MG/DL
GLUCOSE UR-MCNC: NORMAL MG/DL
HGB UR QL STRIP.AUTO: NEGATIVE
KETONES (URINE DIPSTICK): NEGATIVE MG/DL
KETONES UR-MCNC: NEGATIVE MG/DL
LEUKOCYTE ESTERASE UR QL STRIP.AUTO: 500
MULTISTIX LOT#: ABNORMAL NUMERIC
NITRITE, URINE: POSITIVE
PH UR: 5 [PH] (ref 5–8)
PH, URINE: 5.5 (ref 4.5–8)
PROT UR-MCNC: NEGATIVE MG/DL
PROTEIN (URINE DIPSTICK): NEGATIVE MG/DL
SP GR UR STRIP: 1.02 (ref 1–1.03)
SPECIFIC GRAVITY: 1.02 (ref 1–1.03)
URINE-COLOR: YELLOW
UROBILINOGEN UR STRIP-ACNC: NORMAL
UROBILINOGEN,SEMI-QN: 0.2 MG/DL (ref 0–1.9)

## 2024-06-20 PROCEDURE — 81002 URINALYSIS NONAUTO W/O SCOPE: CPT | Performed by: UROLOGY

## 2024-06-20 PROCEDURE — 99204 OFFICE O/P NEW MOD 45 MIN: CPT | Performed by: UROLOGY

## 2024-06-20 RX ORDER — DIAZEPAM 5 MG/1
15 TABLET ORAL ONCE
Qty: 3 TABLET | Refills: 0 | Status: SHIPPED | OUTPATIENT
Start: 2024-06-20 | End: 2024-06-20

## 2024-06-20 RX ORDER — HYDROCODONE BITARTRATE AND ACETAMINOPHEN 5; 325 MG/1; MG/1
2 TABLET ORAL
Qty: 2 TABLET | Refills: 0 | Status: SHIPPED | OUTPATIENT
Start: 2024-06-20 | End: 2024-06-20

## 2024-06-20 NOTE — PROGRESS NOTES
[facility]  Initial Office Consultation    HPI:   Preston Casey is a 29 year old male here today for consultation at the request of, and a copy of this note will be sent to, Chandana Quezada MD.    Patient presents today in consultation for further evaluation and counseling regarding a bilateral vasectomy as a method of permanent sterility and family planning. He has no children. He is sexually active with his life partner (31 F) and has no other sexual partners. They currently use condoms for contraception. He desires to have a vasectomy for permanent sterility.     He denies fevers or chills, change in appetite, fatigue, or unintentional weight loss.  He denies dysuria, gross hematuria, frequency, urgency, nocturia, or flank pain.    He had a urinalysis on 5/10/2024 by his PCP which revealed microscopic hematuria with 3-5 RBCs per hpf.      PAST MEDICAL HISTORY: None.    PAST SURGICAL HISTORY: Anal fistulotomy and hemorrhoid banding 6/2024.    SOCIAL HISTORY: In a committed relationship.  No children.  No smoking or illicit drug use.  Works in inventory management..     FAMILY HISTORY: No family history of prostate cancer or  malignancies.     Allergies: Patient has no known allergies.      REVIEW OF SYSTEMS:  Pertinent positives and negatives per HPI. A 10-point ROS was performed and is otherwise negative.       EXAM:  BP (!) 138/95 (BP Location: Left arm, Patient Position: Sitting, Cuff Size: adult)   Pulse 75   Ht 5' 10\" (1.778 m)   Wt 226 lb (102.5 kg)   BMI 32.43 kg/m²     Physical Exam  Constitutional:       General: He is not in acute distress.     Appearance: He is well-developed.   HENT:      Head: Normocephalic.   Eyes:      General: No scleral icterus.  Cardiovascular:      Rate and Rhythm: Normal rate.   Pulmonary:      Effort: Pulmonary effort is normal.   Abdominal:      General: There is no distension.      Palpations: Abdomen is soft.      Tenderness: There is  no abdominal tenderness.   Genitourinary:     Penis: Circumcised. Hypospadias (glanular) present.       Testes:         Right: Mass, tenderness, swelling, testicular hydrocele or varicocele not present.         Left: Mass, tenderness, swelling, testicular hydrocele or varicocele not present.      Epididymis:      Right: Normal.      Left: Normal.      Comments: Normal spermatic cord structures bilaterally.  Vasa palpable bilaterally.  Slightly thickened scrotal skin.  Skin:     General: Skin is warm and dry.   Neurological:      Mental Status: He is alert and oriented to person, place, and time.   Psychiatric:         Mood and Affect: Mood normal.         Behavior: Behavior normal.        LABS:  No results found.       IMAGING:  No results found.       IMPRESSION:  29 year old male here for evaluation and counseling regarding a bilateral vasectomy as a permanent method of sterility and family planning.     I had a lengthy discussion with the patient regarding vasectomy as a method of permanent sterility and family planning. We discussed the indications, benefits, risks, technique of vas occlusion, possible complications and alternatives, with emphasis on the following points:    Vasectomy is intended to be a permanent form of contraception.    Vasectomy does not produce immediate sterility.    Following vasectomy, another form of contraception is required until vas occlusion is confirmed by post- vasectomy semen analysis (PVSA).    Even after vas occlusion is confirmed, vasectomy is not 100% reliable in preventing pregnancy.    The risk of pregnancy after vasectomy is approximately 1 in 2,000 for men who have post-vasectomy azoospermia or PVSA showing rare non-motile sperm (RNMS).    Repeat vasectomy is necessary in ?1% of vasectomies, provided that a technique for vas occlusion known to have a low occlusive failure rate has been used.    Patients should refrain from ejaculation for approximately one week after  vasectomy.    Options for fertility after vasectomy include vasectomy reversal and sperm retrieval with in vitro fertilization. These options are not always successful, and they may be expensive.    The rates of surgical complications such as symptomatic hematoma and infection are 1-2%. These rates vary with the surgeon's experience and the criteria used to diagnose these conditions.    Chronic scrotal pain associated with negative impact on quality of life occurs after vasectomy in about 1-2% of men. Few of these men require additional surgery.    Other permanent and non-permanent alternatives to vasectomy are available.    The patient verbalized understanding and asked appropriate questions that were addressed during this visit. He wishes to proceed with an in-office bilateral vasectomy to achieve permanent sterility.     We discussed finding of microscopic hematuria on urinalysis from 5/10/2024.  Shared decision making performed.  Discussed obtaining a repeat urinalysis versus cystoscopy and renal ultrasound.  Patient elects repeat urinalysis.      PLAN:  Patient will be scheduled for a bilateral vasectomy in the office setting. Electronic prescriptions were provided for 15 mg of oral Diazepam and 2 tablets of Norco 5/325 mg.     Lizz-procedural instructions were provided by myself, re-iterated by the office staff and provided in written form as well.     2.  Repeat urinalysis as a follow-up to UA showing trace microscopic hematuria from 5/10/2024.      Erwin Joy MD  6/20/2024

## 2024-06-21 ENCOUNTER — TELEPHONE (OUTPATIENT)
Dept: SURGERY | Facility: CLINIC | Age: 29
End: 2024-06-21

## 2024-06-21 NOTE — TELEPHONE ENCOUNTER
Pt is scheduled for office vasectomy with Dr. MEYER on 9/12/24 I called pt insurance, Blaise spoke with representative, Di MALDONADO After giving name, dx and cpt code no prior is needed ref# [id number]/[id number]:[id number]

## 2024-06-22 ENCOUNTER — PATIENT MESSAGE (OUTPATIENT)
Dept: SURGERY | Facility: CLINIC | Age: 29
End: 2024-06-22

## 2024-06-24 ENCOUNTER — OFFICE VISIT (OUTPATIENT)
Dept: SURGERY | Facility: CLINIC | Age: 29
End: 2024-06-24

## 2024-06-24 DIAGNOSIS — T14.8XXA OPEN WOUND: Primary | ICD-10-CM

## 2024-06-24 NOTE — TELEPHONE ENCOUNTER
I called the patient and confirmed his full name and . I read Dr. Joy's message to the patient. The patient has already scheduled an appointment for the ultrasound in July. Pt states he has no symptoms at all and feels fine.     The patient stated that he will get the antibiotics and I asked him to give another urine sample approx. 2-3 days he finishes the antibiotics to ensure the infection is gone. Pt verbalized his understanding.     I told him that we would call him if any of the tests show anything.

## 2024-06-24 NOTE — PROGRESS NOTES
[facility]    Progress Note    Preston Casey Patient Status:  Specimen    1995 MRN [id number]   Location [facility] Attending No att. providers found   Hosp Day # 0 PCP Chandana Quezada MD     Assessment and Plan:     Postop open wound after internal hemorrhoid banding and for stage anal fistulotomy.  Expected postoperative pain.  Seeing urology for possible vasectomy.    Subjective:     -Continue current plan.  See me back in 6 weeks for reevaluation    Objective:   No data found.        Exam: Wound with some drainage.  Seton present.    Results:     Lab Results   Component Value Date    WBC 4.7 05/10/2024    HGB 14.9 05/10/2024    HCT 44.3 05/10/2024    .0 05/10/2024    CREATSERUM 1.09 05/10/2024    BUN 20 05/10/2024     05/10/2024    K 3.9 05/10/2024     05/10/2024    CO2 26.0 05/10/2024    GLU 87 05/10/2024    CA 10.2 05/10/2024    ALB 5.0 (H) 05/10/2024    ALKPHO 113 05/10/2024    BILT 0.5 05/10/2024    TP 8.4 (H) 05/10/2024    AST 27 05/10/2024    ALT 41 05/10/2024    TSH 1.535 05/10/2024       No results found.            Alvin Au MD  2024

## 2024-06-24 NOTE — TELEPHONE ENCOUNTER
----- Message from Erwin Joy sent at 6/24/2024  9:40 AM CDT -----  Please inform patient that his repeat urinalysis did not show any microscopic blood in the urine however it did show evidence of a UTI and the urine culture was positive.    It did not sound like he had any symptoms suggestive of a UTI when I saw him in the office.  Please ask if he has any burning with urination, increased frequency or urgency of urination, suprapubic pressure, or flank pain.    I still recommend treating his urine infection with Bactrim DS twice daily for 7 days followed by rechecking a urine culture for resolution of the UTI after he completed the antibiotic course.      I also recommend obtaining an ultrasound of the kidneys and bladder to look for any possible sources of this infection.    Thank you.

## 2024-06-27 ENCOUNTER — TELEPHONE (OUTPATIENT)
Dept: SURGERY | Facility: CLINIC | Age: 29
End: 2024-06-27

## 2024-06-27 ENCOUNTER — HOSPITAL ENCOUNTER (OUTPATIENT)
Dept: ULTRASOUND IMAGING | Facility: HOSPITAL | Age: 29
Discharge: HOME | End: 2024-06-27
Attending: INTERNAL MEDICINE

## 2024-06-27 DIAGNOSIS — M25.532 LEFT WRIST PAIN: ICD-10-CM

## 2024-06-27 PROCEDURE — 76882 US LMTD JT/FCL EVL NVASC XTR: CPT | Performed by: INTERNAL MEDICINE

## 2024-06-27 NOTE — TELEPHONE ENCOUNTER
Spoke w/ pt.   Pt rescheduled appt in [facility] to 7/11 at 12:00 PM.  Informed pt we reserve 12-1 for urgent injuries.  Rescheduled pt back to original appt date on 7/8.  Pt verbalized an understanding.

## 2024-07-03 ENCOUNTER — LAB ENCOUNTER (OUTPATIENT)
Dept: LAB | Age: 29
End: 2024-07-03
Attending: UROLOGY
Payer: COMMERCIAL

## 2024-07-03 DIAGNOSIS — N39.0 URINARY TRACT INFECTION WITHOUT HEMATURIA, SITE UNSPECIFIED: ICD-10-CM

## 2024-07-03 DIAGNOSIS — A49.8 CITROBACTER INFECTION: ICD-10-CM

## 2024-07-03 PROCEDURE — 87086 URINE CULTURE/COLONY COUNT: CPT

## 2024-07-08 ENCOUNTER — OFFICE VISIT (OUTPATIENT)
Dept: SURGERY | Facility: CLINIC | Age: 29
End: 2024-07-08
Payer: COMMERCIAL

## 2024-07-08 DIAGNOSIS — M67.432 GANGLION OF LEFT WRIST: Primary | ICD-10-CM

## 2024-07-08 PROCEDURE — 99204 OFFICE O/P NEW MOD 45 MIN: CPT | Performed by: PLASTIC SURGERY

## 2024-07-08 RX ORDER — DIAZEPAM 5 MG/1
15 TABLET ORAL
COMMUNITY
Start: 2024-06-20

## 2024-07-08 RX ORDER — HYDROCODONE BITARTRATE AND ACETAMINOPHEN 5; 325 MG/1; MG/1
2 TABLET ORAL AS DIRECTED
COMMUNITY
Start: 2024-06-20

## 2024-07-08 NOTE — H&P
Preston Casey is a 29 year old male that presents with   Chief Complaint   Patient presents with    Ganglion     L wrist cyst   .    REFERRED BY:  Chandana Quezada    Pacemaker: No  Latex Allergy: no  Coumadin: No  Plavix: No  Other anticoagulants: No  Diet medication: No  Cardiac stents: No    HAND DOMINANCE:  Right    Profession:     RECONSTRUCTIVE HISTORY    SUN EXPOSURE   Current no   Past no   Sunburns no   Tanning salons current no   Tanning salons past no     SKIN CANCER    Personal history of skin cancer: none      HPI:       29-year-old male right-hand-dominant with a left wrist ganglion    2 months duration    Decreased in size    Mild discomfort with some repetitive movements    No functional problems          Review of Systems:   Constitutional: No change in appetite, chill/rigors, or fatigue  GI: No jaundice  Endocrine: No generalized weakness  Neurological: No aphasia, loss of consciousness, or seizures    Musculoskeletal:     Duration  2 months    Location:    Left   Dorsal wrist       Size: decreased    Pain:  no, mild discomfort w/ some repetitive, vigorous movements per pt    Functional problems:  no    Left dorsal wrist mass    PMH:     MEDICAL  Past Medical History:    Depression        SURGICAL  Past Surgical History:   Procedure Laterality Date    Removal anal fistula,complex/multi  06/11/2024        ALLERIGIES  No Known Allergies     MEDICATIONS  Current Outpatient Medications   Medication Sig Dispense Refill    diazePAM 5 MG Oral Tab Take 3 tablets (15 mg total) by mouth. (Patient not taking: Reported on 7/8/2024)      HYDROcodone-acetaminophen 5-325 MG Oral Tab Take 2 tablets by mouth As Directed. (Patient not taking: Reported on 7/8/2024)          SOCIAL HISTORY  Social History     Socioeconomic History    Marital status: Single    Number of children: 0   Tobacco Use    Smoking status: Never     Passive exposure: Never    Smokeless tobacco: Never   Vaping Use     Vaping status: Never Used   Substance and Sexual Activity    Alcohol use: Never    Drug use: Never   Other Topics Concern    Reaction to local anesthetic No    Pt has a pacemaker No    Pt has a defibrillator No        FAMILY HISTORY  Family History   Problem Relation Age of Onset    Cancer Mother     No Known Problems Father           PHYSICAL EXAM:     CONSTITUTIONAL: Overall appearance - Normal  HEENT: Normocephalic  EYES: Conjunctiva - Right: Normal, Left: Normal; EOMI  EARS: Inspection - Right: Normal, Left: Normal  NECK/THYROID: Inspection - Normal, Palpation - Normal, Thyroid gland - Normal, No adenopathy  RESPIRATORY: Inspection - Normal, Effort - Normal  CARDIOVASCULAR: Regular rhythm, No murmurs  ABDOMEN: Inspection - Normal, No abdominal tenderness  NEURO: Memory intact  PSYCH: Oriented to person, place, time, and situation, Appropriate mood and affect      Hand Physical Exam:     Left dorsal wrist: 1.5 cm firm nontender ganglion  Full painless range of motion  No pain with flexion extension against distance    Ultrasound independently interpreted: Cystic mass overlying scaphoid and lunate, emanating from the radiocarpal joint    ASSESSMENT/PLAN:     GANGLION     left wrist dorsal, asymptomatic    We discussed what a ganglion/mass is, including treatment options.  Questions were answered and the patient wishes to proceed with treatment.     This ganglion/mass is  Asymptomatic / minimally symptomatic.  It does not need to be excised at this point.  If it becomes symptomatic (eg, pain, increase in size, functional problems, skin thinning), I would recommend it be excised.      7/8/2024  Amadou Cerna MD        +++++++++++++++++++++++++++++++++++++++++      MEDICAL DECISION MAKING    PROBLEMS      MODERATE    (number / complexity)          Undiagnosed new illness with uncertain prognosis    DATA         MODERATE    (amount / complexity)          Ultrasound independently reviewed    MANAGEMENT  RISK  MODERATE    (complications/ morbidity)       Decision regarding major surgery                  MDM LEVEL    MODERATE

## 2024-07-12 ENCOUNTER — HOSPITAL ENCOUNTER (OUTPATIENT)
Dept: ULTRASOUND IMAGING | Facility: HOSPITAL | Age: 29
Discharge: HOME | End: 2024-07-12
Attending: UROLOGY
Payer: COMMERCIAL

## 2024-07-12 DIAGNOSIS — N39.0 URINARY TRACT INFECTION WITHOUT HEMATURIA, SITE UNSPECIFIED: ICD-10-CM

## 2024-07-12 DIAGNOSIS — A49.8 CITROBACTER INFECTION: ICD-10-CM

## 2024-07-12 PROCEDURE — 76770 US EXAM ABDO BACK WALL COMP: CPT | Performed by: UROLOGY

## 2024-07-17 ENCOUNTER — OFFICE VISIT (OUTPATIENT)
Dept: SURGERY | Facility: CLINIC | Age: 29
End: 2024-07-17
Payer: COMMERCIAL

## 2024-07-17 VITALS — HEIGHT: 70 IN | WEIGHT: 226 LBS | BODY MASS INDEX: 32.35 KG/M2

## 2024-07-17 DIAGNOSIS — K60.3 ANAL FISTULA: Primary | ICD-10-CM

## 2024-07-17 PROCEDURE — 99213 OFFICE O/P EST LOW 20 MIN: CPT | Performed by: SURGERY

## 2024-07-17 NOTE — PROGRESS NOTES
[facility]    Progress Note    Preston Casey Patient Status:  Specimen    1995 MRN [id number]   Location [facility] Attending No att. providers found   Hosp Day # 0 PCP Chandana Quezada MD     Assessment and Plan:     Anal fistula post for stage fistulotomy and seton placement.  He presents for evaluation  -Seton has worked its way out.  Mild granulation tissue.  Continue current plan.  Follow-up 3 weeks if any bleeding or drainage    Subjective:   Scant bleeding on the toilet paper.  No pain or discharge    Objective:   No data found.        Exam: Digital anoscopic exam is performed.  Site of fistulotomy identified at 5:00 in the left lateral decubitus position.  No seton could be identified with the anoscope.  Scant granulation tissue at the site    Results:     Lab Results   Component Value Date    WBC 4.7 05/10/2024    HGB 14.9 05/10/2024    HCT 44.3 05/10/2024    .0 05/10/2024    CREATSERUM 1.09 05/10/2024    BUN 20 05/10/2024     05/10/2024    K 3.9 05/10/2024     05/10/2024    CO2 26.0 05/10/2024    GLU 87 05/10/2024    CA 10.2 05/10/2024    ALB 5.0 (H) 05/10/2024    ALKPHO 113 05/10/2024    BILT 0.5 05/10/2024    TP 8.4 (H) 05/10/2024    AST 27 05/10/2024    ALT 41 05/10/2024    TSH 1.535 05/10/2024       No results found.            Alvin Au MD  2024

## 2024-09-10 ENCOUNTER — TELEPHONE (OUTPATIENT)
Dept: SURGERY | Facility: CLINIC | Age: 29
End: 2024-09-10

## 2024-09-12 ENCOUNTER — PROCEDURE (OUTPATIENT)
Dept: SURGERY | Facility: CLINIC | Age: 29
End: 2024-09-12
Payer: COMMERCIAL

## 2024-09-12 VITALS — SYSTOLIC BLOOD PRESSURE: 126 MMHG | DIASTOLIC BLOOD PRESSURE: 72 MMHG | HEART RATE: 80 BPM

## 2024-09-12 DIAGNOSIS — Z30.2 ENCOUNTER FOR STERILIZATION: Primary | ICD-10-CM

## 2024-09-12 DIAGNOSIS — Z30.8 POSTVASECTOMY SPERM COUNT: ICD-10-CM

## 2024-09-12 PROCEDURE — 55250 REMOVAL OF SPERM DUCT(S): CPT | Performed by: UROLOGY

## 2024-09-12 NOTE — PROCEDURES
UROLOGY PROCEDURE NOTE  NO-SCALPEL BILATERAL VASECTOMY      PREOPERATIVE DIAGNOSIS: Desires voluntary sterilization - bilateral vasectomy.    POSTOPERATIVE DIAGNOSIS: Desires voluntary sterilization - bilateral vasectomy.    PROCEDURE PERFORMED: Voluntary sterilization - bilateral no-scalpel vasectomy.     ANESTHESIA: Diazepam 15 mg orally and 2 pills of hydrocodone 5/325 mg orally and 2% Xylocaine injectable.       INDICATIONS:   Before surgery today, I once again discussed  with the patient the following issues, complications, side effects of vasectomy: possible failure of the procedure with possibility that patient could still end up making his wife pregnant, despite undergoing this procedure; bleeding complications; wound infection; pain that might never go away; the fact that he is still fertile immediately after the procedure; that if he changes his mind, a reversal operation may not  be successful; as well as other side effects and complications and the patient understands and still wishes to proceed and feels comfortable with his decision.     DESCRIPTION OF THE PROCEDURE:      The patient was given the anesthesia as indicated above. He was prepped and draped in usual sterile fashion.      The right vas deferens was isolated under the scrotal skin. The skin over it was infiltrated with 2% Plain Xylocaine. A small opening was made in the skin over the vas; the vas was delivered out of the wound; it was skeletonized and exposed for a 3-4 cm segment. Hemostasis was controlled by electrocoagulation. A 1 cm segment was excised. The ends were fulgurated. One medium clip was placed on the abdominal side and another medium clip was placed on the testicular side. The ends were then allowed to fall back into the wound.      The exact same procedure was performed on the contralateral side through the same skin opening. At the end of the procedure, the skin was approximated using Skin Affix topical skin adhesive.  Each  segment of vas deferens was sent separately in formalin to pathology for identification.  The patient tolerated the procedure well.     Post-procedural care and follow-up instructions were provided.     Erwin Joy MD  09/12/24

## 2024-09-27 ENCOUNTER — OFFICE VISIT (OUTPATIENT)
Dept: DERMATOLOGY CLINIC | Facility: CLINIC | Age: 29
End: 2024-09-27
Payer: COMMERCIAL

## 2024-09-27 DIAGNOSIS — H01.133 EYELID DERMATITIS, ECZEMATOUS, RIGHT: Primary | ICD-10-CM

## 2024-09-27 PROCEDURE — 99213 OFFICE O/P EST LOW 20 MIN: CPT | Performed by: PHYSICIAN ASSISTANT

## 2024-09-27 RX ORDER — HYDROCORTISONE 25 MG/G
1 OINTMENT TOPICAL 2 TIMES DAILY
Qty: 20 G | Refills: 0 | Status: SHIPPED | OUTPATIENT
Start: 2024-09-27

## 2024-09-27 NOTE — PROGRESS NOTES
HPI:    Patient ID: Preston Casey is a 29 year old male.    Patient presents for dryness around the eyes, specifically around the right eye. Redness noted at times. Denies itching. Applying lotion with some improvement. No draining or tenderness noted. No allergies to medications noted.         Review of Systems   Constitutional:  Negative for chills and fever.   Musculoskeletal:  Negative for arthralgias and myalgias.   Skin:  Positive for rash. Negative for color change and wound.            Current Outpatient Medications   Medication Sig Dispense Refill    hydrocortisone 2.5 % External Ointment Apply 1 Application topically 2 (two) times daily. Use as needed 20 g 0    diazePAM 5 MG Oral Tab Take 3 tablets (15 mg total) by mouth.      HYDROcodone-acetaminophen 5-325 MG Oral Tab Take 2 tablets by mouth As Directed.       Allergies:No Known Allergies   There were no vitals taken for this visit.  There is no height or weight on file to calculate BMI.  PHYSICAL EXAM:   Physical Exam  Constitutional:       General: He is not in acute distress.     Appearance: Normal appearance.   Skin:     General: Skin is warm and dry.      Findings: Rash present.      Comments: Slight dryness noted under the right eye. No draining or tenderness noted. No scaling noted. No erythema noted.    Neurological:      Mental Status: He is alert and oriented to person, place, and time.                ASSESSMENT/PLAN:   1. Eyelid dermatitis, eczematous, right  -After discussion with patient, advised the following:  -Start hydrocortisone  -Educated to apply 2 times per day for 1 week   -Stop after 1 week   -Return if not improving  -Only use when flaring.   -Went over side effects.   -To call or follow-up with worsening symptoms or concerns.   -Pt was agreeable to plan and will comply with discussion above.         No orders of the defined types were placed in this encounter.      Meds This Visit:  Requested Prescriptions     Signed  Prescriptions Disp Refills    hydrocortisone 2.5 % External Ointment 20 g 0     Sig: Apply 1 Application topically 2 (two) times daily. Use as needed       Imaging & Referrals:  None         ID#[id number]

## 2024-10-04 ENCOUNTER — OFFICE VISIT (OUTPATIENT)
Dept: INTERNAL MEDICINE CLINIC | Facility: CLINIC | Age: 29
End: 2024-10-04
Payer: COMMERCIAL

## 2024-10-04 VITALS
OXYGEN SATURATION: 99 % | TEMPERATURE: 98 F | WEIGHT: 227 LBS | BODY MASS INDEX: 32.5 KG/M2 | HEIGHT: 70 IN | HEART RATE: 67 BPM | DIASTOLIC BLOOD PRESSURE: 76 MMHG | SYSTOLIC BLOOD PRESSURE: 122 MMHG | RESPIRATION RATE: 17 BRPM

## 2024-10-04 DIAGNOSIS — R05.2 SUBACUTE COUGH: Primary | ICD-10-CM

## 2024-10-04 PROCEDURE — 99213 OFFICE O/P EST LOW 20 MIN: CPT | Performed by: INTERNAL MEDICINE

## 2024-10-04 RX ORDER — MONTELUKAST SODIUM 10 MG/1
10 TABLET ORAL DAILY
Qty: 30 TABLET | Refills: 0 | Status: SHIPPED | OUTPATIENT
Start: 2024-10-04

## 2024-10-04 RX ORDER — BENZONATATE 100 MG/1
100 CAPSULE ORAL 3 TIMES DAILY PRN
Qty: 20 CAPSULE | Refills: 0 | Status: SHIPPED | OUTPATIENT
Start: 2024-10-04 | End: 2024-10-11

## 2024-10-04 NOTE — PROGRESS NOTES
Subjective:   Patient ID: Preston Casey is a 29 year old male.    HPI  Patient comes in today with complaint of ongoing cough for over a week ago he did get tested for COVID was negative the cough is little better but worse at night during the day is usually fine but at night he coughs all night no fever no chills no shortness of breath     History/Other:   Review of Systems   Constitutional: Negative.    HENT: Negative.     Eyes: Negative.    Respiratory:  Positive for cough. Negative for shortness of breath and wheezing.    Cardiovascular: Negative.    Gastrointestinal: Negative.    Genitourinary: Negative.    Musculoskeletal: Negative.    Skin: Negative.    Neurological: Negative.    Psychiatric/Behavioral: Negative.       Current Outpatient Medications   Medication Sig Dispense Refill    benzonatate 100 MG Oral Cap Take 1 capsule (100 mg total) by mouth 3 (three) times daily as needed for cough. 20 capsule 0    montelukast (SINGULAIR) 10 MG Oral Tab Take 1 tablet (10 mg total) by mouth daily. 30 tablet 0    hydrocortisone 2.5 % External Ointment Apply 1 Application topically 2 (two) times daily. Use as needed (Patient not taking: Reported on 10/4/2024) 20 g 0    diazePAM 5 MG Oral Tab Take 3 tablets (15 mg total) by mouth.      HYDROcodone-acetaminophen 5-325 MG Oral Tab Take 2 tablets by mouth As Directed.       Allergies:No Known Allergies    Objective:   Physical Exam  Vitals and nursing note reviewed.   Constitutional:       Appearance: He is well-developed.   HENT:      Head: Normocephalic and atraumatic.      Right Ear: External ear normal.      Left Ear: External ear normal.      Nose: Nose normal.   Eyes:      Conjunctiva/sclera: Conjunctivae normal.      Pupils: Pupils are equal, round, and reactive to light.   Cardiovascular:      Rate and Rhythm: Normal rate and regular rhythm.      Heart sounds: Normal heart sounds.   Pulmonary:      Effort: Pulmonary effort is normal.      Breath sounds:  Normal breath sounds. No wheezing or rales.   Abdominal:      General: Bowel sounds are normal.      Palpations: Abdomen is soft.   Genitourinary:     Penis: Normal.       Prostate: Normal.      Rectum: Normal.   Musculoskeletal:         General: Normal range of motion.      Cervical back: Normal range of motion and neck supple.   Skin:     General: Skin is warm and dry.   Neurological:      Mental Status: He is alert and oriented to person, place, and time.      Deep Tendon Reflexes: Reflexes are normal and symmetric.         Assessment & Plan:   1. Subacute cough we will give medication for cough take as needed also Singulair at night let us know if not better       No orders of the defined types were placed in this encounter.      Meds This Visit:  Requested Prescriptions     Signed Prescriptions Disp Refills    benzonatate 100 MG Oral Cap 20 capsule 0     Sig: Take 1 capsule (100 mg total) by mouth 3 (three) times daily as needed for cough.    montelukast (SINGULAIR) 10 MG Oral Tab 30 tablet 0     Sig: Take 1 tablet (10 mg total) by mouth daily.       Imaging & Referrals:  None

## 2024-10-25 ENCOUNTER — LAB ENCOUNTER (OUTPATIENT)
Dept: LAB | Facility: HOSPITAL | Age: 29
End: 2024-10-25
Attending: UROLOGY
Payer: COMMERCIAL

## 2024-10-25 DIAGNOSIS — Z30.8 POSTVASECTOMY SPERM COUNT: ICD-10-CM

## 2024-10-25 PROCEDURE — 89321 SEMEN ANAL SPERM DETECTION: CPT

## 2024-12-03 ENCOUNTER — OFFICE VISIT (OUTPATIENT)
Dept: INTERNAL MEDICINE CLINIC | Facility: CLINIC | Age: 29
End: 2024-12-03
Payer: COMMERCIAL

## 2024-12-03 VITALS
TEMPERATURE: 97 F | RESPIRATION RATE: 18 BRPM | DIASTOLIC BLOOD PRESSURE: 82 MMHG | OXYGEN SATURATION: 98 % | BODY MASS INDEX: 33.36 KG/M2 | HEART RATE: 89 BPM | HEIGHT: 70 IN | WEIGHT: 233 LBS | SYSTOLIC BLOOD PRESSURE: 124 MMHG

## 2024-12-03 DIAGNOSIS — N50.811 PAIN IN BOTH TESTICLES: Primary | ICD-10-CM

## 2024-12-03 DIAGNOSIS — N50.812 PAIN IN BOTH TESTICLES: Primary | ICD-10-CM

## 2024-12-03 LAB
BILIRUB UR QL: NEGATIVE
CLARITY UR: CLEAR
GLUCOSE UR-MCNC: NORMAL MG/DL
HGB UR QL STRIP.AUTO: NEGATIVE
KETONES UR-MCNC: NEGATIVE MG/DL
LEUKOCYTE ESTERASE UR QL STRIP.AUTO: 250
PH UR: 5.5 [PH] (ref 5–8)
PROT UR-MCNC: NEGATIVE MG/DL
SP GR UR STRIP: 1.02 (ref 1–1.03)
UROBILINOGEN UR STRIP-ACNC: NORMAL

## 2024-12-03 PROCEDURE — 99214 OFFICE O/P EST MOD 30 MIN: CPT | Performed by: INTERNAL MEDICINE

## 2024-12-04 ENCOUNTER — OFFICE VISIT (OUTPATIENT)
Dept: SURGERY | Facility: CLINIC | Age: 29
End: 2024-12-04
Payer: COMMERCIAL

## 2024-12-04 DIAGNOSIS — Z98.52 S/P VASECTOMY: ICD-10-CM

## 2024-12-04 DIAGNOSIS — N50.812 PAIN IN LEFT TESTICLE: Primary | ICD-10-CM

## 2024-12-04 RX ORDER — SULFAMETHOXAZOLE AND TRIMETHOPRIM 800; 160 MG/1; MG/1
1 TABLET ORAL 2 TIMES DAILY
Qty: 10 TABLET | Refills: 0 | Status: SHIPPED | OUTPATIENT
Start: 2024-12-04 | End: 2024-12-09

## 2024-12-04 NOTE — PROGRESS NOTES
[facility]rology  Follow-Up Visit    HPI: Preston Casey is a 29 year old male presents for a follow up visit. Patient was last seen on 9/12/24.    INTERVAL HISTORY: Status post bilateral in office no scalpel vasectomy 9/12/2024.  Pathology revealing complete cross-sections of the vasa bilaterally.     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    He was seen by his PCP 12/3/2024 and reports feeling some pain in and a lump on his left testicle noted earlier this week.  No urinary symptoms.  No fevers or chills.    Scrotal ultrasound ordered by PCP and scheduled for 12/11/2024.    Urinalysis obtained revealing 2+ leuks and 2+ nitrites.  11-20 WBCs, no RBCs.  No bacteria.  Urine culture pending.    Feeling better today and reports discomfort at the level of 1.      Reviewed past medical, surgical, family, and social history.  Reviewed med list and allergies.      REVIEW OF SYSTEMS:  Pertinent positives and negatives per HPI. A 10-point ROS was performed and is otherwise negative.       EXAM:  There were no vitals taken for this visit.    Physical Exam  Constitutional:       Appearance: He is well-developed.   HENT:      Head: Normocephalic.   Eyes:      General: No scleral icterus.  Cardiovascular:      Rate and Rhythm: Normal rate.   Pulmonary:      Effort: Pulmonary effort is normal.   Genitourinary:     Penis: Circumcised. Hypospadias present.       Testes:         Right: Mass, tenderness or swelling not present.         Left: Mass, tenderness or swelling not present.      Comments: Mild left epididymal swelling. No hydroceles or varicoceles.   Skin:     General: Skin is warm and dry.   Neurological:      Mental Status: He is alert and oriented to person, place, and time.   Psychiatric:         Mood and Affect: Mood normal.         Behavior: Behavior normal.       PATHOLOGY:  See HPI      LABS:  See HPI      IMAGING:  No results found.      UROLOGY PROCEDURE:  None performed  today      IMPRESSION & PLAN:  29 year old male status post bilateral vasectomy 9/12/2024.    Reporting mild left testicular pain earlier this week.  On exam today, no significant abnormalities.  May be mild swelling of the left epididymis tail.    Reports pain has significantly improved and is down to a level of 1 today.    Recommend ibuprofen 600 mg 3 times daily with meals for 48 hours.  Rest for 48 hours.  Scrotal support.  Urine culture pending however UA with leuks and nitrites but no bacteria.  We will treat empirically with Bactrim DS for 5 days.    He may proceed with scrotal ultrasound as scheduled.    Due for second postvasectomy semen analysis later this week.  He will submit.    Patient agrees to plan.  He will return for follow-up as needed if symptoms worsen or fail to improve.    All questions answered.          Erwin Joy MD  12/4/2024

## 2024-12-04 NOTE — PROGRESS NOTES
Subjective:     Patient ID: Preston Casey is a 29 year old male.    HPI  Pt comes in with complaint of testicular pain, Left side mostly felt also a lump pt had vasectomy recently. No urinary symptoms ,   History/Other:   Review of Systems   Constitutional: Negative.    HENT: Negative.     Eyes: Negative.    Respiratory: Negative.     Cardiovascular: Negative.    Gastrointestinal: Negative.    Genitourinary:  Positive for testicular pain. Negative for dysuria, frequency and urgency.   Musculoskeletal: Negative.    Skin: Negative.    Neurological: Negative.    Psychiatric/Behavioral: Negative.       No current outpatient medications on file.     Allergies:Allergies[1]    Past Medical History:    Depression      Past Surgical History:   Procedure Laterality Date    Removal anal fistula,complex/multi  06/11/2024    Vasectomy  9/12/2024      Family History   Problem Relation Age of Onset    Cancer Mother     No Known Problems Father       Social History:   Social History     Socioeconomic History    Marital status: Single    Number of children: 0   Tobacco Use    Smoking status: Never     Passive exposure: Never    Smokeless tobacco: Never   Vaping Use    Vaping status: Never Used   Substance and Sexual Activity    Alcohol use: Never    Drug use: Never   Other Topics Concern    Reaction to local anesthetic No    Pt has a pacemaker No    Pt has a defibrillator No        Objective:   Physical Exam  Vitals and nursing note reviewed.   Constitutional:       Appearance: He is well-developed.   HENT:      Head: Normocephalic and atraumatic.      Right Ear: External ear normal.      Left Ear: External ear normal.      Nose: Nose normal.   Eyes:      Conjunctiva/sclera: Conjunctivae normal.      Pupils: Pupils are equal, round, and reactive to light.   Cardiovascular:      Rate and Rhythm: Normal rate and regular rhythm.      Heart sounds: Normal heart sounds.   Pulmonary:      Effort: Pulmonary effort is normal.       Breath sounds: Normal breath sounds.   Abdominal:      General: Bowel sounds are normal.      Palpations: Abdomen is soft.   Genitourinary:     Penis: Normal.       Prostate: Normal.      Rectum: Normal.      Comments: Testicular pain  ? lump  Musculoskeletal:         General: Normal range of motion.      Cervical back: Normal range of motion and neck supple.   Skin:     General: Skin is warm and dry.   Neurological:      Mental Status: He is alert and oriented to person, place, and time.      Deep Tendon Reflexes: Reflexes are normal and symmetric.         Assessment & Plan:   1. Pain in both testicles - will get US will refer to urology       Orders Placed This Encounter   Procedures    UA/M WITH CULTURE REFLEX [3020]       Meds This Visit:  Requested Prescriptions      No prescriptions requested or ordered in this encounter       Imaging & Referrals:  UROLOGY - INTERNAL  US SCROTUM W/ DOPPLER (CPT=93975/95353)            [1] No Known Allergies

## 2024-12-06 ENCOUNTER — LAB ENCOUNTER (OUTPATIENT)
Dept: LAB | Facility: HOSPITAL | Age: 29
End: 2024-12-06
Attending: UROLOGY
Payer: COMMERCIAL

## 2024-12-06 DIAGNOSIS — Z30.8 POSTVASECTOMY SPERM COUNT: ICD-10-CM

## 2024-12-06 PROCEDURE — 89321 SEMEN ANAL SPERM DETECTION: CPT

## 2025-05-09 ENCOUNTER — OFFICE VISIT (OUTPATIENT)
Dept: INTERNAL MEDICINE CLINIC | Facility: CLINIC | Age: 30
End: 2025-05-09
Payer: COMMERCIAL

## 2025-05-09 VITALS
SYSTOLIC BLOOD PRESSURE: 116 MMHG | HEART RATE: 67 BPM | HEIGHT: 70 IN | OXYGEN SATURATION: 98 % | TEMPERATURE: 98 F | RESPIRATION RATE: 17 BRPM | BODY MASS INDEX: 33.07 KG/M2 | WEIGHT: 231 LBS | DIASTOLIC BLOOD PRESSURE: 80 MMHG

## 2025-05-09 DIAGNOSIS — Z00.00 ANNUAL PHYSICAL EXAM: Primary | ICD-10-CM

## 2025-05-09 DIAGNOSIS — E66.9 OBESITY (BMI 30-39.9): ICD-10-CM

## 2025-05-09 LAB
ALBUMIN SERPL-MCNC: 5.1 G/DL (ref 3.2–4.8)
ALBUMIN/GLOB SERPL: 1.7 {RATIO} (ref 1–2)
ALP LIVER SERPL-CCNC: 105 U/L (ref 45–117)
ALT SERPL-CCNC: 58 U/L (ref 10–49)
ANION GAP SERPL CALC-SCNC: 9 MMOL/L (ref 0–18)
AST SERPL-CCNC: 47 U/L (ref ?–34)
BASOPHILS # BLD AUTO: 0.02 X10(3) UL (ref 0–0.2)
BASOPHILS NFR BLD AUTO: 0.5 %
BILIRUB SERPL-MCNC: 0.9 MG/DL (ref 0.3–1.2)
BILIRUB UR QL: NEGATIVE
BUN BLD-MCNC: 14 MG/DL (ref 9–23)
BUN/CREAT SERPL: 11.8 (ref 10–20)
CALCIUM BLD-MCNC: 9.6 MG/DL (ref 8.7–10.4)
CHLORIDE SERPL-SCNC: 104 MMOL/L (ref 98–112)
CHOLEST SERPL-MCNC: 248 MG/DL (ref ?–200)
CO2 SERPL-SCNC: 25 MMOL/L (ref 21–32)
COLOR UR: YELLOW
CREAT BLD-MCNC: 1.19 MG/DL (ref 0.7–1.3)
DEPRECATED RDW RBC AUTO: 38.6 FL (ref 35.1–46.3)
EGFRCR SERPLBLD CKD-EPI 2021: 83 ML/MIN/1.73M2 (ref 60–?)
EOSINOPHIL # BLD AUTO: 0.06 X10(3) UL (ref 0–0.7)
EOSINOPHIL NFR BLD AUTO: 1.6 %
ERYTHROCYTE [DISTWIDTH] IN BLOOD BY AUTOMATED COUNT: 12.4 % (ref 11–15)
FASTING PATIENT LIPID ANSWER: YES
FASTING STATUS PATIENT QL REPORTED: YES
GLOBULIN PLAS-MCNC: 3 G/DL (ref 2–3.5)
GLUCOSE BLD-MCNC: 78 MG/DL (ref 70–99)
GLUCOSE UR-MCNC: NORMAL MG/DL
HCT VFR BLD AUTO: 45.5 % (ref 39–53)
HDLC SERPL-MCNC: 47 MG/DL (ref 40–59)
HGB BLD-MCNC: 15.4 G/DL (ref 13–17.5)
IMM GRANULOCYTES # BLD AUTO: 0 X10(3) UL (ref 0–1)
IMM GRANULOCYTES NFR BLD: 0 %
KETONES UR-MCNC: NEGATIVE MG/DL
LDLC SERPL CALC-MCNC: 177 MG/DL (ref ?–100)
LEUKOCYTE ESTERASE UR QL STRIP.AUTO: 500
LYMPHOCYTES # BLD AUTO: 1.87 X10(3) UL (ref 1–4)
LYMPHOCYTES NFR BLD AUTO: 48.4 %
MCH RBC QN AUTO: 29.2 PG (ref 26–34)
MCHC RBC AUTO-ENTMCNC: 33.8 G/DL (ref 31–37)
MCV RBC AUTO: 86.3 FL (ref 80–100)
MONOCYTES # BLD AUTO: 0.32 X10(3) UL (ref 0.1–1)
MONOCYTES NFR BLD AUTO: 8.3 %
NEUTROPHILS # BLD AUTO: 1.59 X10 (3) UL (ref 1.5–7.7)
NEUTROPHILS # BLD AUTO: 1.59 X10(3) UL (ref 1.5–7.7)
NEUTROPHILS NFR BLD AUTO: 41.2 %
NONHDLC SERPL-MCNC: 201 MG/DL (ref ?–130)
OSMOLALITY SERPL CALC.SUM OF ELEC: 285 MOSM/KG (ref 275–295)
PH UR: 5.5 [PH] (ref 5–8)
PLATELET # BLD AUTO: 264 10(3)UL (ref 150–450)
POTASSIUM SERPL-SCNC: 3.9 MMOL/L (ref 3.5–5.1)
PROT SERPL-MCNC: 8.1 G/DL (ref 5.7–8.2)
PROT UR-MCNC: 20 MG/DL
RBC # BLD AUTO: 5.27 X10(6)UL (ref 4.3–5.7)
SODIUM SERPL-SCNC: 138 MMOL/L (ref 136–145)
SP GR UR STRIP: 1.02 (ref 1–1.03)
TRIGL SERPL-MCNC: 134 MG/DL (ref 30–149)
TSI SER-ACNC: 1.16 UIU/ML (ref 0.55–4.78)
UROBILINOGEN UR STRIP-ACNC: NORMAL
VLDLC SERPL CALC-MCNC: 27 MG/DL (ref 0–30)
WBC # BLD AUTO: 3.9 X10(3) UL (ref 4–11)
WBC #/AREA URNS AUTO: >50 /HPF
WBC CLUMPS UR QL AUTO: PRESENT /HPF

## 2025-05-09 PROCEDURE — 99395 PREV VISIT EST AGE 18-39: CPT | Performed by: INTERNAL MEDICINE

## 2025-05-09 PROCEDURE — 36415 COLL VENOUS BLD VENIPUNCTURE: CPT | Performed by: INTERNAL MEDICINE

## 2025-05-10 DIAGNOSIS — R79.89 ELEVATED LFTS: Primary | ICD-10-CM

## 2025-05-10 DIAGNOSIS — D72.819 LEUKOPENIA, UNSPECIFIED TYPE: ICD-10-CM

## 2025-05-10 RX ORDER — CIPROFLOXACIN 250 MG/1
250 TABLET, FILM COATED ORAL 2 TIMES DAILY
Qty: 10 TABLET | Refills: 0 | Status: SHIPPED | OUTPATIENT
Start: 2025-05-10 | End: 2025-05-15

## 2025-05-12 ENCOUNTER — PATIENT MESSAGE (OUTPATIENT)
Dept: INTERNAL MEDICINE CLINIC | Facility: CLINIC | Age: 30
End: 2025-05-12

## 2025-05-12 DIAGNOSIS — N39.0 URINARY TRACT INFECTION WITHOUT HEMATURIA, SITE UNSPECIFIED: Primary | ICD-10-CM

## 2025-05-12 DIAGNOSIS — E78.00 HYPERCHOLESTEREMIA: ICD-10-CM

## 2025-05-12 RX ORDER — ATORVASTATIN CALCIUM 20 MG/1
20 TABLET, FILM COATED ORAL NIGHTLY
Qty: 90 TABLET | Refills: 1 | Status: SHIPPED | OUTPATIENT
Start: 2025-05-12

## 2025-05-12 NOTE — TELEPHONE ENCOUNTER
I think patient should take the antibiotic then we can retest urine 1 week after treatment is done he should repeat the CBC  Will send the cholesterol medication we can recheck in 3 months please also get the EKG done

## 2025-05-15 NOTE — PROGRESS NOTES
Subjective:     Patient ID: Preston Casey is a 30 year old male.    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  Negative for fatigue and fever.   HENT: Negative.  Negative for congestion.    Eyes: Negative.    Respiratory: Negative.  Negative for cough, shortness of breath and wheezing.    Cardiovascular: Negative.  Negative for chest pain, palpitations and leg swelling.   Gastrointestinal: Negative.    Endocrine: Negative for cold intolerance and heat intolerance.   Genitourinary: Negative.  Negative for dysuria, flank pain and hematuria.   Musculoskeletal: Negative.  Negative for arthralgias, back pain and myalgias.   Skin: Negative.    Neurological: Negative.  Negative for dizziness, tremors, syncope, weakness and headaches.   Psychiatric/Behavioral: Negative.  Negative for agitation, behavioral problems and suicidal ideas. The patient is not nervous/anxious.      Current Medications[1]  Allergies:Allergies[2]    Past Medical History[3]   Past Surgical History[4]   Family History[5]   Social History: Short Social Hx on File[6]     Objective:   Physical Exam  Vitals and nursing note reviewed.   Constitutional:       Appearance: He is well-developed.   HENT:      Head: Normocephalic and atraumatic.      Right Ear: External ear normal.      Left Ear: External ear normal.      Nose: Nose normal.   Eyes:      Conjunctiva/sclera: Conjunctivae normal.      Pupils: Pupils are equal, round, and reactive to light.   Cardiovascular:      Rate and Rhythm: Normal rate and regular rhythm.      Heart sounds: Normal heart sounds.    Pulmonary:      Effort: Pulmonary effort is normal.      Breath sounds: Normal breath sounds.   Abdominal:      General: Bowel sounds are normal.      Palpations: Abdomen is soft.   Genitourinary:     Penis: Normal.       Prostate: Normal.      Rectum: Normal.   Musculoskeletal:         General: Normal range of motion.      Cervical back: Normal range of motion and neck supple.   Skin:     General: Skin is warm and dry.   Neurological:      Mental Status: He is alert and oriented to person, place, and time.      Deep Tendon Reflexes: Reflexes are normal and symmetric.         Assessment & Plan:   1. Annual physical exam exam is okay will order labs   2. Obesity (BMI 30-39.9) patient has been trying for at least 6 months with diet and exercise without any results interested in GLP-1 agonist will check with his insurance and see what they cover and will let us know he would be a good candidate for GLP-1 agonist       Orders Placed This Encounter   Procedures    CBC With Differential With Platelet    Comp Metabolic Panel (14)    Lipid Panel    TSH W Reflex To Free T4    Urinalysis, Routine       Meds This Visit:  Requested Prescriptions      No prescriptions requested or ordered in this encounter       Imaging & Referrals:  EKG 12-LEAD            [1]   Current Outpatient Medications   Medication Sig Dispense Refill    atorvastatin 20 MG Oral Tab Take 1 tablet (20 mg total) by mouth nightly. 90 tablet 1    ciprofloxacin 250 MG Oral Tab Take 1 tablet (250 mg total) by mouth 2 (two) times daily for 5 days. 10 tablet 0   [2] No Known Allergies  [3]   Past Medical History:   Depression    Hyperlipidemia   [4]   Past Surgical History:  Procedure Laterality Date    Removal anal fistula,complex/multi  06/11/2024    Vasectomy  9/12/2024   [5]   Family History  Problem Relation Age of Onset    Cancer Mother     No Known Problems Father    [6]   Social History  Socioeconomic History    Marital status: Single    Number of  children: 0   Tobacco Use    Smoking status: Never     Passive exposure: Never    Smokeless tobacco: Never   Vaping Use    Vaping status: Never Used   Substance and Sexual Activity    Alcohol use: Never    Drug use: Never   Other Topics Concern    Reaction to local anesthetic No    Pt has a pacemaker No    Pt has a defibrillator No

## 2025-05-16 ENCOUNTER — OFFICE VISIT (OUTPATIENT)
Facility: CLINIC | Age: 30
End: 2025-05-16
Payer: COMMERCIAL

## 2025-05-16 VITALS
WEIGHT: 232 LBS | SYSTOLIC BLOOD PRESSURE: 121 MMHG | HEIGHT: 70 IN | HEART RATE: 74 BPM | DIASTOLIC BLOOD PRESSURE: 76 MMHG | BODY MASS INDEX: 33.21 KG/M2

## 2025-05-16 DIAGNOSIS — Z71.3 RESTRICTED DIET: ICD-10-CM

## 2025-05-16 DIAGNOSIS — R79.89 ELEVATED LFTS: Primary | ICD-10-CM

## 2025-05-16 PROCEDURE — 99204 OFFICE O/P NEW MOD 45 MIN: CPT

## 2025-05-16 NOTE — PATIENT INSTRUCTIONS
-dietician [phone] or can check with your insurance plan for options    -psychology referral is placed    -increase fiber in diet    -decrease processed foods, fried foods    -complete ultrasound    -follow up with me in 6 months

## 2025-05-16 NOTE — H&P
[facility] - Gastroenterology                                                                                                               Reason for consult: elevated LFTs    Requesting physician or provider: Chandana Quezada MD    Chief Complaint   Patient presents with    Follow - Up     Test results       HPI:   Preston Casey is a 30 year old year-old male with active diagnoses including high cholesterol. Prior medical/surgical history in note table.    he is here today for evaluation  #elevated liver enzymes  -AST 47 & ALT 58 elevation on labs 5/9  -US abdomen scheduled  -just started atorvastatin on 5/12 for high cholesterol  -reports he has symptoms of avoidant/restrictive food intake disorder, usually eats high fat, processed foods. Symptoms of depression in the past, has seen therapist years ago.     Personal hx of liver disease or hepatitis: none   Family hx of liver disease: maternal grandma - non-alcoholic cirrhosis- age 93  Etoh use: none   Meds, herbals, vitamins: none hepatotoxic  Illicit drug use: none   Autoimmune conditions: none     Patient denies symptoms of nausea, vomiting, dyspepsia, dysphagia, odynophagia, globus sensation, heartburn, hematemesis, abdominal pain, change in bowel habits, constipation, diarrhea, hematochezia, or melena. he denies recent change in appetite, fever or unintentional weight loss.      Last colonoscopy: none   Last EGD: none     NSAIDS: none   Tobacco: none   Alcohol: none   Marijuana: none   Illicit drugs: none     FH GI malignancy: none   FH IBD: none     No history of adverse reaction to sedation  No JIAN  No anticoagulants/antiplatelet  No pacemaker/defibrillator    Wt Readings from Last 6 Encounters:   05/16/25 232 lb (105.2 kg)   05/09/25 231 lb (104.8 kg)   12/03/24 233 lb (105.7 kg)   10/04/24 227 lb (103 kg)   07/17/24 226 lb (102.5 kg)   06/20/24 226 lb  (102.5 kg)        History, Medications, Allergies, ROS:      Past Medical History[1]   Past Surgical History[2]   Family Hx: Family History[3]   Social History: Short Social Hx on File[4]     Medications (Active prior to today's visit):  Current Medications[5]    Allergies:  Allergies[6]    ROS:   CONSTITUTIONAL: negative for fevers, chills, sweats  EYES Negative for scleral icterus or redness, and diplopia  HEENT: Negative for hoarseness  RESPIRATORY: Negative for cough and severe shortness of breath  CARDIOVASCULAR: Negative for crushing sub-sternal chest pain  GASTROINTESTINAL: See HPI  GENITOURINARY: Negative for dysuria  MUSCULOSKELETAL: Negative for arthralgias and myalgias  SKIN: Negative for jaundice, rash or pruritus  NEUROLOGICAL: Negative for dizziness and headaches  BEHAVIOR/PSYCH: Negative for psychotic behavior    PHYSICAL EXAM:   Blood pressure 121/76, pulse 74, height 5' 10\" (1.778 m), weight 232 lb (105.2 kg).    GEN: Alert, no acute distress, well-nourished   HEENT: anicteric sclera, neck supple, trachea midline, MMM, no palpable or tender neck or supraclavicular lymph nodes  CV: RRR, the extremities are warm and well perfused   LUNGS: No increased work of breathing, CTAB  ABDOMEN: Soft, symmetrical, non-tender without distention or guarding. No scars or lesions. Aorta is without bruit or visible pulsation. Umbilicus is midline without herniation. Normoactive bowel sounds are present, No masses, hepatomegaly or splenomegaly noted.  MSK: No erythema, no warmth, no swelling of joints  SKIN: No jaundice, no erythema, no rashes, no lesions  HEMATOLOGIC: No bleeding, no bruising  NEURO: Alert and interactive, DAVISON  PSYCH: appropriate mood & affect    Labs/Imaging/Procedures:     Patient's pertinent labs and imaging were reviewed and discussed with patient today.        .  ASSESSMENT/PLAN:   Preston Casey is a 30 year old year-old male with active diagnoses including high cholesterol. Prior  medical/surgical history in note table.    he is here today for evaluation  #elevated liver enzymes  -AST 47 & ALT 58 elevation on labs 5/9  -US abdomen scheduled  -just started atorvastatin on 5/12 for high cholesterol  -reports he has symptoms of avoidant/restrictive food intake disorder, usually eats high fat, processed foods. Symptoms of depression in the past, has seen therapist years ago.     Personal hx of liver disease or hepatitis: none   Family hx of liver disease: maternal grandma - non-alcoholic cirrhosis- age 93  Etoh use: none   Meds, herbals, vitamins: none hepatotoxic  Illicit drug use: none   Autoimmune conditions: none     - Etiology of elevated LFTs possibly related to metabolic dysfunction including obesity and high cholesterol.  Possibly elevation was transient related to recent infection/ inflammatory process.  Agree with ultrasound abdomen which patient has scheduled for next month.  We discussed healthy lifestyle, treatment of high cholesterol, weight loss for treatment of metabolic features.  I have placed a dietitian and psychology referral for his symptoms of avoidant restrictive food intake.  He reports having issues with textures of food.  Recommend 6-month follow-up, advised to come in sooner if needed based on ultrasound result.    Recommendations:    -dietician [phone] or can check with your insurance plan for options    -psychology referral is placed    -increase fiber in diet    -decrease processed foods, fried foods    -complete ultrasound    -follow up with me in 6 months        Orders This Visit:  No orders of the defined types were placed in this encounter.      Meds This Visit:  Requested Prescriptions      No prescriptions requested or ordered in this encounter       Imaging & Referrals:  DIETITIAN EDUCATION INITIAL, DIET (INTERNAL)  PSYCHOLOGY - INTERNAL      CYNTHIA Davis    [facility]  5/16/2025        This note was partially prepared using  Dragon Medical voice recognition dictation software. As a result, errors may occur. When identified, these errors have been corrected. While every attempt is made to correct errors during dictation, discrepancies may still exist.        [1]   Past Medical History:   Depression    Hyperlipidemia   [2]   Past Surgical History:  Procedure Laterality Date    Removal anal fistula,complex/multi  06/11/2024    Vasectomy  9/12/2024   [3]   Family History  Problem Relation Age of Onset    Cancer Mother         uterine sarcoma    No Known Problems Father    [4]   Social History  Socioeconomic History    Marital status: Single    Number of children: 0   Tobacco Use    Smoking status: Never     Passive exposure: Never    Smokeless tobacco: Never   Vaping Use    Vaping status: Never Used   Substance and Sexual Activity    Alcohol use: Never    Drug use: Never   Other Topics Concern    Reaction to local anesthetic No    Pt has a pacemaker No    Pt has a defibrillator No   [5]   Current Outpatient Medications   Medication Sig Dispense Refill    Multiple Vitamin (MULTIVITAMINS OR) Take by mouth.      atorvastatin 20 MG Oral Tab Take 1 tablet (20 mg total) by mouth nightly. 90 tablet 1   [6] No Known Allergies

## 2025-05-21 ENCOUNTER — TELEPHONE (OUTPATIENT)
Age: 30
End: 2025-05-21

## 2025-05-21 NOTE — TELEPHONE ENCOUNTER
Arielle Johnston,    Here are some therapy resources that may be a good fit. Please verify your insurance coverage with any providers that you may choose to call and schedule with directly. If there is anything else I can assist with, then please give me a call at [phone]. If you need more immediate assistance, or assistance outside of business hours, please contact the [employer] 24/7 helpline at [phone]-[phone].     Tina Bates ANN  [facility]  [address]  [address]  [place], [place] [id number] [phone]    Manasa Childers LPC  [facility]  [address]  [address]  [place] [address]  [phone]    Karishma Quarles ANN  [facility]  [address]  [address]  [place], [place] [id number] [phone]

## 2025-05-21 NOTE — TELEPHONE ENCOUNTER
Hello - I am reaching out from the [facility] Navigation department, following up on an order from your provider's office to assist in connecting you with resources for care. If you would like to discuss this further, please give us a call at [phone], or for more immediate assistance you can contact our 24-hour help line at [phone]. We look forward to hearing from you soon.

## 2025-05-23 ENCOUNTER — OFFICE VISIT (OUTPATIENT)
Dept: INTERNAL MEDICINE CLINIC | Facility: CLINIC | Age: 30
End: 2025-05-23
Payer: COMMERCIAL

## 2025-05-23 VITALS
RESPIRATION RATE: 17 BRPM | BODY MASS INDEX: 33.36 KG/M2 | HEART RATE: 81 BPM | DIASTOLIC BLOOD PRESSURE: 76 MMHG | WEIGHT: 233 LBS | SYSTOLIC BLOOD PRESSURE: 118 MMHG | HEIGHT: 70 IN | TEMPERATURE: 98 F | OXYGEN SATURATION: 98 %

## 2025-05-23 DIAGNOSIS — E78.00 HYPERCHOLESTEREMIA: Primary | ICD-10-CM

## 2025-05-23 DIAGNOSIS — N39.0 URINARY TRACT INFECTION WITHOUT HEMATURIA, SITE UNSPECIFIED: ICD-10-CM

## 2025-05-23 DIAGNOSIS — D72.819 LEUKOPENIA, UNSPECIFIED TYPE: ICD-10-CM

## 2025-05-23 DIAGNOSIS — R79.89 ELEVATED LFTS: ICD-10-CM

## 2025-05-23 LAB
BASOPHILS # BLD AUTO: 0.01 X10(3) UL (ref 0–0.2)
BASOPHILS NFR BLD AUTO: 0.2 %
DEPRECATED RDW RBC AUTO: 35.8 FL (ref 35.1–46.3)
EOSINOPHIL # BLD AUTO: 0.06 X10(3) UL (ref 0–0.7)
EOSINOPHIL NFR BLD AUTO: 1.3 %
ERYTHROCYTE [DISTWIDTH] IN BLOOD BY AUTOMATED COUNT: 12 % (ref 11–15)
HCT VFR BLD AUTO: 41.8 % (ref 39–53)
HGB BLD-MCNC: 14.8 G/DL (ref 13–17.5)
IMM GRANULOCYTES # BLD AUTO: 0.01 X10(3) UL (ref 0–1)
IMM GRANULOCYTES NFR BLD: 0.2 %
LYMPHOCYTES # BLD AUTO: 1.99 X10(3) UL (ref 1–4)
LYMPHOCYTES NFR BLD AUTO: 43.5 %
MCH RBC QN AUTO: 29 PG (ref 26–34)
MCHC RBC AUTO-ENTMCNC: 35.4 G/DL (ref 31–37)
MCV RBC AUTO: 81.8 FL (ref 80–100)
MONOCYTES # BLD AUTO: 0.4 X10(3) UL (ref 0.1–1)
MONOCYTES NFR BLD AUTO: 8.8 %
NEUTROPHILS # BLD AUTO: 2.1 X10 (3) UL (ref 1.5–7.7)
NEUTROPHILS # BLD AUTO: 2.1 X10(3) UL (ref 1.5–7.7)
NEUTROPHILS NFR BLD AUTO: 46 %
PLATELET # BLD AUTO: 266 10(3)UL (ref 150–450)
RBC # BLD AUTO: 5.11 X10(6)UL (ref 4.3–5.7)
WBC # BLD AUTO: 4.6 X10(3) UL (ref 4–11)

## 2025-05-23 PROCEDURE — 99214 OFFICE O/P EST MOD 30 MIN: CPT | Performed by: INTERNAL MEDICINE

## 2025-05-23 NOTE — PROGRESS NOTES
Subjective:   Patient ID: Preston Casey is a 30 year old male.    HPI    Patient comes in for follow-up had recent labs cholesterol elevated started on statin taking medication doing okay no side effects also LFTs were elevated has a follow-up with GI although ultrasound was ordered scheduled urine also showed infection he was treated to the antibiotic we will repeat denies any symptoms also white blood count was slightly on the lower side we will need to repeat    History/Other:   Review of Systems   Constitutional: Negative.    HENT: Negative.     Eyes: Negative.    Respiratory: Negative.     Cardiovascular: Negative.    Gastrointestinal: Negative.    Genitourinary: Negative.    Musculoskeletal: Negative.    Skin: Negative.    Neurological: Negative.    Psychiatric/Behavioral: Negative.       Current Medications[1]  Allergies:Allergies[2]    Objective:   Physical Exam  Vitals and nursing note reviewed.   Constitutional:       Appearance: He is well-developed.   HENT:      Head: Normocephalic and atraumatic.      Right Ear: External ear normal.      Left Ear: External ear normal.      Nose: Nose normal.   Eyes:      Conjunctiva/sclera: Conjunctivae normal.      Pupils: Pupils are equal, round, and reactive to light.   Cardiovascular:      Rate and Rhythm: Normal rate and regular rhythm.      Heart sounds: Normal heart sounds.   Pulmonary:      Effort: Pulmonary effort is normal.      Breath sounds: Normal breath sounds.   Abdominal:      General: Bowel sounds are normal.      Palpations: Abdomen is soft.   Genitourinary:     Penis: Normal.       Prostate: Normal.      Rectum: Normal.   Musculoskeletal:         General: Normal range of motion.      Cervical back: Normal range of motion and neck supple.   Skin:     General: Skin is warm and dry.   Neurological:      Mental Status: He is alert and oriented to person, place, and time.      Deep Tendon Reflexes: Reflexes are normal and symmetric.          Assessment & Plan:   1. Hypercholesteremia continue current treatment we will retest next time   2. Urinary tract infection without hematuria, site unspecified to the antibiotic doing okay we will retest   3. Leukopenia, unspecified type will retest will follow results    4. Elevated LFTs get ultrasound done follow-up with GI       Orders Placed This Encounter   Procedures    CBC With Differential With Platelet    UA/M WITH CULTURE REFLEX [3020]       Meds This Visit:  Requested Prescriptions      No prescriptions requested or ordered in this encounter       Imaging & Referrals:  None         [1]   Current Outpatient Medications   Medication Sig Dispense Refill    Multiple Vitamin (MULTIVITAMINS OR) Take by mouth.      atorvastatin 20 MG Oral Tab Take 1 tablet (20 mg total) by mouth nightly. 90 tablet 1   [2] No Known Allergies

## 2025-05-24 LAB
BILIRUB UR QL: NEGATIVE
CLARITY UR: CLEAR
GLUCOSE UR-MCNC: NORMAL MG/DL
HGB UR QL STRIP.AUTO: NEGATIVE
KETONES UR-MCNC: NEGATIVE MG/DL
LEUKOCYTE ESTERASE UR QL STRIP.AUTO: NEGATIVE
NITRITE UR QL STRIP.AUTO: NEGATIVE
PH UR: 6 [PH] (ref 5–8)
PROT UR-MCNC: NEGATIVE MG/DL
SP GR UR STRIP: 1.02 (ref 1–1.03)
UROBILINOGEN UR STRIP-ACNC: NORMAL

## 2025-06-20 ENCOUNTER — HOSPITAL ENCOUNTER (OUTPATIENT)
Dept: ULTRASOUND IMAGING | Facility: HOSPITAL | Age: 30
Discharge: HOME | End: 2025-06-20
Attending: INTERNAL MEDICINE
Payer: COMMERCIAL

## 2025-06-20 DIAGNOSIS — R79.89 ELEVATED LFTS: ICD-10-CM

## 2025-06-20 PROCEDURE — 76705 ECHO EXAM OF ABDOMEN: CPT | Performed by: INTERNAL MEDICINE

## 2025-07-11 ENCOUNTER — OFFICE VISIT (OUTPATIENT)
Dept: INTERNAL MEDICINE CLINIC | Facility: CLINIC | Age: 30
End: 2025-07-11

## 2025-07-11 VITALS
RESPIRATION RATE: 18 BRPM | OXYGEN SATURATION: 99 % | DIASTOLIC BLOOD PRESSURE: 84 MMHG | HEIGHT: 70 IN | WEIGHT: 232 LBS | HEART RATE: 90 BPM | BODY MASS INDEX: 33.21 KG/M2 | TEMPERATURE: 98 F | SYSTOLIC BLOOD PRESSURE: 122 MMHG

## 2025-07-11 DIAGNOSIS — K76.0 HEPATIC STEATOSIS: Primary | ICD-10-CM

## 2025-07-11 DIAGNOSIS — E78.00 HYPERCHOLESTEREMIA: ICD-10-CM

## 2025-07-11 PROCEDURE — 99213 OFFICE O/P EST LOW 20 MIN: CPT | Performed by: INTERNAL MEDICINE

## 2025-07-11 NOTE — PROGRESS NOTES
Subjective:     Patient ID: Preston Casey is a 30 year old male.    HPI    Patient comes in today for follow-up recent liver enzymes slightly elevated ultrasound showed fatty liver patient says he has been working on diet he knows he needs to lose weight denies any complaints today also cholesterol was elevated taking statin watching diet no other complaints today    History/Other:   Review of Systems   Constitutional: Negative.    HENT: Negative.     Eyes: Negative.    Respiratory: Negative.     Cardiovascular: Negative.    Gastrointestinal: Negative.    Genitourinary: Negative.    Musculoskeletal: Negative.    Skin: Negative.    Neurological: Negative.    Psychiatric/Behavioral: Negative.       Current Medications[1]  Allergies:Allergies[2]    Past Medical History[3]   Past Surgical History[4]   Family History[5]   Social History: Short Social Hx on File[6]     Objective:   Physical Exam  Vitals and nursing note reviewed.   Constitutional:       Appearance: He is well-developed.   HENT:      Head: Normocephalic and atraumatic.      Right Ear: External ear normal.      Left Ear: External ear normal.      Nose: Nose normal.   Eyes:      Conjunctiva/sclera: Conjunctivae normal.      Pupils: Pupils are equal, round, and reactive to light.   Cardiovascular:      Rate and Rhythm: Normal rate and regular rhythm.      Heart sounds: Normal heart sounds.   Pulmonary:      Effort: Pulmonary effort is normal.      Breath sounds: Normal breath sounds.   Abdominal:      General: Bowel sounds are normal.      Palpations: Abdomen is soft.   Genitourinary:     Penis: Normal.       Prostate: Normal.      Rectum: Normal.   Musculoskeletal:         General: Normal range of motion.      Cervical back: Normal range of motion and neck supple.   Skin:     General: Skin is warm and dry.   Neurological:      Mental Status: He is alert and oriented to person, place, and time.      Deep Tendon Reflexes: Reflexes are normal and  symmetric.         Assessment & Plan:   1. Hepatic steatosis watch diet continue to lose weight follow back in 6 months we can retest labs and ultrasound with elastography   2. Hypercholesteremia watch diet continue current treatment take medication       No orders of the defined types were placed in this encounter.      Meds This Visit:  Requested Prescriptions      No prescriptions requested or ordered in this encounter       Imaging & Referrals:  None            [1]   Current Outpatient Medications   Medication Sig Dispense Refill    Multiple Vitamin (MULTIVITAMINS OR) Take by mouth.      atorvastatin 20 MG Oral Tab Take 1 tablet (20 mg total) by mouth nightly. 90 tablet 1   [2] No Known Allergies  [3]   Past Medical History:   Depression    Hyperlipidemia   [4]   Past Surgical History:  Procedure Laterality Date    Removal anal fistula,complex/multi  06/11/2024    Vasectomy  9/12/2024   [5]   Family History  Problem Relation Age of Onset    Cancer Mother         Uterine Sarcoma    No Known Problems Father    [6]   Social History  Socioeconomic History    Marital status: Single    Number of children: 0   Tobacco Use    Smoking status: Never     Passive exposure: Never    Smokeless tobacco: Never   Vaping Use    Vaping status: Never Used   Substance and Sexual Activity    Alcohol use: Never    Drug use: Never   Other Topics Concern    Reaction to local anesthetic No    Pt has a pacemaker No    Pt has a defibrillator No

## 2025-07-19 ENCOUNTER — HOSPITAL ENCOUNTER (EMERGENCY)
Facility: HOSPITAL | Age: 30
Discharge: HOME | End: 2025-07-19
Attending: EMERGENCY MEDICINE
Payer: COMMERCIAL

## 2025-07-19 ENCOUNTER — APPOINTMENT (OUTPATIENT)
Dept: GENERAL RADIOLOGY | Facility: HOSPITAL | Age: 30
End: 2025-07-19
Payer: COMMERCIAL

## 2025-07-19 VITALS
TEMPERATURE: 98 F | SYSTOLIC BLOOD PRESSURE: 134 MMHG | HEART RATE: 75 BPM | DIASTOLIC BLOOD PRESSURE: 88 MMHG | RESPIRATION RATE: 14 BRPM | OXYGEN SATURATION: 97 %

## 2025-07-19 DIAGNOSIS — E87.6 HYPOKALEMIA: ICD-10-CM

## 2025-07-19 DIAGNOSIS — R07.89 CHEST PAIN, ATYPICAL: Primary | ICD-10-CM

## 2025-07-19 LAB
ALBUMIN SERPL-MCNC: 5.2 G/DL (ref 3.2–4.8)
ALBUMIN/GLOB SERPL: 1.9 (ref 1–2)
ALP LIVER SERPL-CCNC: 121 U/L (ref 45–117)
ALT SERPL-CCNC: 69 U/L (ref 10–49)
ANION GAP SERPL CALC-SCNC: 11 MMOL/L (ref 0–18)
AST SERPL-CCNC: 37 U/L (ref ?–34)
BASOPHILS # BLD AUTO: 0.03 X10(3) UL (ref 0–0.2)
BASOPHILS NFR BLD AUTO: 0.4 %
BILIRUB SERPL-MCNC: 1.2 MG/DL (ref 0.3–1.2)
BUN BLD-MCNC: 14 MG/DL (ref 9–23)
BUN/CREAT SERPL: 11.3 (ref 10–20)
CALCIUM BLD-MCNC: 10.1 MG/DL (ref 8.7–10.4)
CHLORIDE SERPL-SCNC: 100 MMOL/L (ref 98–112)
CO2 SERPL-SCNC: 28 MMOL/L (ref 21–32)
CREAT BLD-MCNC: 1.24 MG/DL (ref 0.7–1.3)
D DIMER PPP FEU-MCNC: <0.27 UG/ML FEU (ref ?–0.5)
DEPRECATED RDW RBC AUTO: 33.2 FL (ref 35.1–46.3)
EGFRCR SERPLBLD CKD-EPI 2021: 79 ML/MIN/1.73M2 (ref 60–?)
EOSINOPHIL # BLD AUTO: 0.05 X10(3) UL (ref 0–0.7)
EOSINOPHIL NFR BLD AUTO: 0.7 %
ERYTHROCYTE [DISTWIDTH] IN BLOOD BY AUTOMATED COUNT: 11.5 % (ref 11–15)
GLOBULIN PLAS-MCNC: 2.8 G/DL (ref 2–3.5)
GLUCOSE BLD-MCNC: 128 MG/DL (ref 70–99)
HCT VFR BLD AUTO: 42.2 % (ref 39–53)
HGB BLD-MCNC: 15.1 G/DL (ref 13–17.5)
IMM GRANULOCYTES # BLD AUTO: 0.02 X10(3) UL (ref 0–1)
IMM GRANULOCYTES NFR BLD: 0.3 %
LYMPHOCYTES # BLD AUTO: 2.76 X10(3) UL (ref 1–4)
LYMPHOCYTES NFR BLD AUTO: 41.3 %
MCH RBC QN AUTO: 28.9 PG (ref 26–34)
MCHC RBC AUTO-ENTMCNC: 35.8 G/DL (ref 31–37)
MCV RBC AUTO: 80.7 FL (ref 80–100)
MONOCYTES # BLD AUTO: 0.37 X10(3) UL (ref 0.1–1)
MONOCYTES NFR BLD AUTO: 5.5 %
NEUTROPHILS # BLD AUTO: 3.45 X10 (3) UL (ref 1.5–7.7)
NEUTROPHILS # BLD AUTO: 3.45 X10(3) UL (ref 1.5–7.7)
NEUTROPHILS NFR BLD AUTO: 51.8 %
OSMOLALITY SERPL CALC.SUM OF ELEC: 290 MOSM/KG (ref 275–295)
PLATELET # BLD AUTO: 240 10(3)UL (ref 150–450)
POTASSIUM SERPL-SCNC: 3.1 MMOL/L (ref 3.5–5.1)
PROT SERPL-MCNC: 8 G/DL (ref 5.7–8.2)
RBC # BLD AUTO: 5.23 X10(6)UL (ref 4.3–5.7)
SODIUM SERPL-SCNC: 139 MMOL/L (ref 136–145)
TROPONIN I SERPL HS-MCNC: 3 NG/L (ref ?–53)
WBC # BLD AUTO: 6.7 X10(3) UL (ref 4–11)

## 2025-07-19 PROCEDURE — 80053 COMPREHEN METABOLIC PANEL: CPT

## 2025-07-19 PROCEDURE — 93005 ELECTROCARDIOGRAM TRACING: CPT

## 2025-07-19 PROCEDURE — 84484 ASSAY OF TROPONIN QUANT: CPT

## 2025-07-19 PROCEDURE — 71045 X-RAY EXAM CHEST 1 VIEW: CPT

## 2025-07-19 PROCEDURE — 80053 COMPREHEN METABOLIC PANEL: CPT | Performed by: EMERGENCY MEDICINE

## 2025-07-19 PROCEDURE — 85025 COMPLETE CBC W/AUTO DIFF WBC: CPT

## 2025-07-19 PROCEDURE — 93010 ELECTROCARDIOGRAM REPORT: CPT

## 2025-07-19 PROCEDURE — 85025 COMPLETE CBC W/AUTO DIFF WBC: CPT | Performed by: EMERGENCY MEDICINE

## 2025-07-19 PROCEDURE — 36415 COLL VENOUS BLD VENIPUNCTURE: CPT

## 2025-07-19 PROCEDURE — 84484 ASSAY OF TROPONIN QUANT: CPT | Performed by: EMERGENCY MEDICINE

## 2025-07-19 PROCEDURE — 99285 EMERGENCY DEPT VISIT HI MDM: CPT

## 2025-07-19 PROCEDURE — 85379 FIBRIN DEGRADATION QUANT: CPT | Performed by: EMERGENCY MEDICINE

## 2025-07-19 PROCEDURE — 99284 EMERGENCY DEPT VISIT MOD MDM: CPT

## 2025-07-19 RX ORDER — POTASSIUM CHLORIDE 1500 MG/1
40 TABLET, EXTENDED RELEASE ORAL ONCE
Status: COMPLETED | OUTPATIENT
Start: 2025-07-19 | End: 2025-07-19

## 2025-07-19 NOTE — DISCHARGE INSTRUCTIONS
Continue taking all your home medication.  Take over-the-counter Tylenol ibuprofen as needed for any pain follow-up with your primary care provider in 1 to 2 days.  Return to the ER if some continue, get worse, unable to follow-up   Order signed

## 2025-07-19 NOTE — ED QUICK NOTES
Pt states around [id number] he was sitting on the couch and developed sharp L sided CP that lasted less than 5 minutes. Pt states he then developed lightheadedness and nausea. Only remaining symptoms is intermittent nausea. Pt denies SOB, back pain, diaphoresis, vomiting, flushing. Pt states \"I mightve had too much caffeine late in the day\". Pt A&Ox4, ambulatory.

## 2025-07-19 NOTE — ED INITIAL ASSESSMENT (HPI)
Transient moment of chest pain with accompanying dizziness, approx 20 minutes prior to arrival that lasted 2-5 minutes. Pt states that pain is now resolved but was concerned prompting this ER visit. Pt has no significant family history contributing to this visit complaint.

## 2025-07-19 NOTE — ED PROVIDER NOTES
Patient Seen in: [facility] Emergency Department    History     Chief Complaint   Patient presents with    Chest Pain       HPI    30-year-old male with history of hyperlipidemia presents here today complaining of left-sided chest pain that started approximately 11 PM to 12 midnight tonight.  Patient is stabbing pain to the left side of his chest.  No shortness of breath no leg swelling or redness.  No hemoptysis.  Patient got better by the time he got to the ER.    History reviewed. Past Medical History[1]    History reviewed. Past Surgical History[2]      Medications :  Prescriptions Prior to Admission[3]     Family History[4]    Smoking Status: Social Hx on file[5]    Constitutional and vital signs reviewed.      Social History and Family History elements reviewed from today, pertinent positives to the presenting problem noted.    Physical Exam     ED Triage Vitals   BP 07/19/25 0016 (!) 150/92   Pulse 07/19/25 0016 71   Resp 07/19/25 0016 20   Temp 07/19/25 0016 98.3 °F (36.8 °C)   Temp src --    SpO2 07/19/25 0016 97 %   O2 Device 07/19/25 0130 None (Room air)       All measures to prevent infection transmission during my interaction with the patient were taken. Handwashing was performed prior to and after the exam.  Stethoscope and any equipment used during my examination was cleaned with super sani-cloth germicidal wipes following the exam.     Physical Exam  Vitals and nursing note reviewed.   Cardiovascular:      Rate and Rhythm: Normal rate.      Heart sounds: Normal heart sounds.   Pulmonary:      Effort: Pulmonary effort is normal.   Abdominal:      Palpations: Abdomen is soft.   Skin:     General: Skin is warm and dry.   Neurological:      Mental Status: He is alert and oriented to person, place, and time.         ED Course        Labs Reviewed   COMP METABOLIC PANEL (14) - Abnormal; Notable for the following components:       Result Value    Glucose 128 (*)     Potassium 3.1 (*)     ALT 69 (*)      AST 37 (*)     Alkaline Phosphatase 121 (*)     Albumin 5.2 (*)     All other components within normal limits   CBC WITH DIFFERENTIAL WITH PLATELET - Abnormal; Notable for the following components:    RDW-SD 33.2 (*)     All other components within normal limits   TROPONIN I HIGH SENSITIVITY - Normal   D-DIMER - Normal   RAINBOW DRAW LAVENDER   RAINBOW DRAW LIGHT GREEN   RAINBOW DRAW BLUE     EKG    Rate, intervals and axes as noted on EKG Report.  Rate: 83  Rhythm: Sinus Rhythm  Reading: Normal sinus rhythm, heart rate 83, normal intervals, normal axis           As Interpreted by me    Imaging Results Available and Reviewed while in ED: No results found.  Preliminary Radiology Report  [facility], [facility]  [phone] - Phone    [facility]    NAME: TAYLOR OHARA    DATE OF EXAM: 07/19/2025  Patient No:  [id number]  Physician:    YOB: 1995    Past Medical History (entered by Technologist):    Reason For Exam (entered by Technologist):  SHARP LEFT SIDED CHEST PAIN TODAY  Other Notes (entered by Technologist): [phone]    Additional Information (per [facility]):      XR CHEST     IMPRESSION:     No radiographic evidence of acute pulmonary process.  Unremarkable cardiomediastinal shiluette.   No pleural effusion or pneumothorax.     Case faxed/finalized at 2:29 AM ET.  If there are any questions please contact me at [phone].          Se Robert Mittal MD  This report has been electronically signed and verified by the Radiologist whose name is printed above.       This report contains privileged and confidential information and is intended solely for the use of the individual or entity to which it is addressed. If you are not the intended recipient of this report, you are hereby notified that any copying, distribution, dissemination or action taken in relation to the contents of this report is strictly prohibited and may be unlawful. If you have received this report  in error, please notify the sender immediately at [phone] and permanently delete the original report and destroy any copies or printouts.    ED Medications Administered:   Medications   potassium chloride (Klor-Con M20) tab 40 mEq (has no administration in time range)         MDM     Vitals:    07/19/25 0016 07/19/25 0130 07/19/25 0200   BP: (!) 150/92 (!) 139/98 134/88   Pulse: 71 75 75   Resp: 20 16 14   Temp: 98.3 °F (36.8 °C)     SpO2: 97% 98% 97%     *I personally reviewed and interpreted all ED vitals.    Pulse Ox: 97%, Room air, Normal     Monitor Interpretation:   normal sinus rhythm as interpreted by me.  The cardiac monitor was ordered to monitor cardiac rate and rhythm.    Differential Diagnosis/ Diagnostic Considerations: MI, PE, chest wall pain, GERD,       Complicating Factors: The patient already has does not have any pertinent problems on file. to contribute to the complexity of this ED evaluation.    Medical Decision Making  Amount and/or Complexity of Data Reviewed  External Data Reviewed: notes.     Details: I reviewed notes from patient's primary care provider Dr. Quezada to help contribute to patient's medical history along with previous labs compare today look for any new and acute changes  Labs: ordered. Decision-making details documented in ED Course.  Radiology: ordered and independent interpretation performed. Decision-making details documented in ED Course.  ECG/medicine tests: ordered and independent interpretation performed. Decision-making details documented in ED Course.    Risk  Prescription drug management.      I reviewed all results with the patient and family at the bedside.  Patient's potassium was slightly low otherwise labs did not show anything acute.  Troponin and D-dimer both negative the rest of lab did not show anything acute.  I reviewed the chest x-ray rangers is nothing acute which is confirmed by radiologist.  EKG also did not have any acute changes.  I explained to  them unsure what is causing his pain but there is nothing acute on his workup can be discharged home.  Patient does understand continue all his home medication.  Follow-up with his primary care provider in 1 to 2 days.  Return to the ER if some continue, get worse, unable to follow-up  Condition upon leaving the department: Stable    Disposition and Plan     Clinical Impression:  1. Chest pain, atypical    2. Hypokalemia        Disposition:  Discharge    Follow-up:  Chandana Quezada MD  [address] [place] [id number]  [phone]    Follow up        Medications Prescribed:  Current Discharge Medication List                           [1]   Past Medical History:   Depression    Hyperlipidemia   [2]   Past Surgical History:  Procedure Laterality Date    Removal anal fistula,complex/multi  06/11/2024    Vasectomy  9/12/2024   [3] (Not in a hospital admission)   [4]   Family History  Problem Relation Age of Onset    Cancer Mother         Uterine Sarcoma    No Known Problems Father    [5]   Social History  Socioeconomic History    Marital status: Single    Number of children: 0   Tobacco Use    Smoking status: Never     Passive exposure: Never    Smokeless tobacco: Never   Vaping Use    Vaping status: Never Used   Substance and Sexual Activity    Alcohol use: Never    Drug use: Never   Other Topics Concern    Reaction to local anesthetic No    Pt has a pacemaker No    Pt has a defibrillator No

## 2025-07-20 LAB
ATRIAL RATE: 83 BPM
P AXIS: 51 DEGREES
P-R INTERVAL: 170 MS
Q-T INTERVAL: 354 MS
QRS DURATION: 116 MS
QTC CALCULATION (BEZET): 415 MS
R AXIS: 40 DEGREES
T AXIS: 37 DEGREES
VENTRICULAR RATE: 83 BPM

## (undated) DEVICE — SYRINGE MED 10ML LL TIP W/O SFTY DISP

## (undated) DEVICE — DRAPE,UNDRBUT,WHT GRAD PCH,CAPPORT,20/CS: Brand: MEDLINE

## (undated) DEVICE — PAD PREP 24X43.5IN W/ PCH

## (undated) DEVICE — SUT PERMA- 2-0 30IN SH NABSRB BLK L26MM 1/

## (undated) DEVICE — DRAPE,LITHOTOMY,STERILE: Brand: MEDLINE

## (undated) DEVICE — STERILE SYNTHETIC POLYISOPRENE POWDER-FREE SURGICAL GLOVES WITH HYDROGEL COATING: Brand: PROTEXIS

## (undated) DEVICE — SOLUTION IRRIG 1000ML 0.9% NACL USP BTL

## (undated) DEVICE — JELLY,LUBE,STERILE,FLIP TOP,TUBE,2-OZ: Brand: MEDLINE

## (undated) DEVICE — SYRINGE MED 20ML STD CLR PLAS LL TIP N CTRL

## (undated) DEVICE — SKIN PREP TRAY 4 COMPARTM TRAY: Brand: MEDLINE INDUSTRIES, INC.

## (undated) DEVICE — MINOR GENERAL: Brand: MEDLINE INDUSTRIES, INC.

## (undated) DEVICE — SUT CHRM GUT 2-0 27IN SH ABSRB UD 26MM 1/2

## (undated) DEVICE — E-Z CLEAN, NON-STICK, PTFE COATED, ELECTROSURGICAL BLADE ELECTRODE, 2.75 INCH (7 CM): Brand: MEGADYNE

## (undated) DEVICE — UNDERPANTS MAT 2XL FOR 24-64IN WAIST PUR

## (undated) NOTE — LETTER
24      Patient: Preston Casey  : 1995 Visit date: 2024    Dear Chandana,      I examined your patient in consultation today.    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  This does not need to be excised.  If he develops pain or functional problems, we will consider excision.    Thank you for your kind referral. If I may answer any questions, please feel free to contact me.     Sincerely,   Amadou Cerna MD     CC:   No Recipients

## (undated) NOTE — LETTER
[facility]  [address], [place], [place]    Authorization for Surgical Operation and Procedure                               I hereby authorize Alvin Au MD, my physician and his/her assistants (if applicable), which may include medical students, residents, and/or fellows, to perform the following surgical operation/ procedure and administer such anesthesia as may be determined necessary by my physician: Operation/Procedure name (s) Exam under anesthesia, anal fistulotomy, possible banding, possible hemorrhoidectomy on Preston Casey   2.   I recognize that during the surgical operation/procedure, unforeseen conditions may necessitate additional or different procedures than those listed above.  I, therefore, further authorize and request that the above-named surgeon, assistants, or designees perform such procedures as are, in their judgment, necessary and desirable.    3.   My surgeon/physician has discussed prior to my surgery the potential benefits, risks and side effects of this procedure; the likelihood of achieving goals; and potential problems that might occur during recuperation.  They also discussed reasonable alternatives to the procedure, including risks, benefits, and side effects related to the alternatives and risks related to not receiving this procedure.  I have had all my questions answered and I acknowledge that no guarantee has been made as to the result that may be obtained.    4.   Should the need arise during my operation/procedure, which includes change of level of care prior to discharge, I also consent to the administration of blood and/or blood products.  Further, I understand that despite careful testing and screening of blood or blood products by collecting agencies, I may still be subject to ill effects as a result of receiving a blood transfusion and/or blood products.  The following are some, but not all, of the potential risks that can occur: fever  and allergic reactions, hemolytic reactions, transmission of diseases such as Hepatitis, AIDS and Cytomegalovirus (CMV) and fluid overload.  In the event that I wish to have an autologous transfusion of my own blood, or a directed donor transfusion, I will discuss this with my physician.  Check only if Refusing Blood or Blood Products  I understand refusal of blood or blood products as deemed necessary by my physician may have serious consequences to my condition to include possible death. I hereby assume responsibility for my refusal and release the hospital, its personnel, and my physicians from any responsibility for the consequences of my refusal.    o  Refuse   5.   I authorize the use of any specimen, organs, tissues, body parts or foreign objects that may be removed from my body during the operation/procedure for diagnosis, research or teaching purposes and their subsequent disposal by hospital authorities.  I also authorize the release of specimen test results and/or written reports to my treating physician on the hospital medical staff or other referring or consulting physicians involved in my care, at the discretion of the Pathologist or my treating physician.    6.   I consent to the photographing or videotaping of the operations or procedures to be performed, including appropriate portions of my body for medical, scientific, or educational purposes, provided my identity is not revealed by the pictures or by descriptive texts accompanying them.  If the procedure has been photographed/videotaped, the surgeon will obtain the original picture, image, videotape or CD.  The hospital will not be responsible for storage, release or maintenance of the picture, image, tape or CD.    7.   I consent to the presence of a  or observers in the operating room as deemed necessary by my physician or their designees.    8.   I recognize that in the event my procedure results in extended X-Ray/fluoroscopy  time, I may develop a skin reaction.    9. If I have a Do Not Attempt Resuscitation (DNAR) order in place, that status will be suspended while in the operating room, procedural suite, and during the recovery period unless otherwise explicitly stated by me (or a person authorized to consent on my behalf). The surgeon or my attending physician will determine when the applicable recovery period ends for purposes of reinstating the DNAR order.  10. Patients having a sterilization procedure: I understand that if the procedure is successful the results will be permanent and it will therefore be impossible for me to inseminate, conceive, or bear children.  I also understand that the procedure is intended to result in sterility, although the result has not been guaranteed.   11. I acknowledge that my physician has explained sedation/analgesia administration to me including the risk and benefits I consent to the administration of sedation/analgesia as may be necessary or desirable in the judgment of my physician.    I CERTIFY THAT I HAVE READ AND FULLY UNDERSTAND THE ABOVE CONSENT TO OPERATION and/or OTHER PROCEDURE.     ____________________________________  _________________________________        ______________________________  Signature of Patient    Signature of Responsible Person                Printed Name of Responsible Person                                      ____________________________________  _____________________________                ________________________________  Signature of Witness        Date  Time         Relationship to Patient    STATEMENT OF PHYSICIAN My signature below affirms that prior to the time of the procedure; I have explained to the patient and/or his/her legal representative, the risks and benefits involved in the proposed treatment and any reasonable alternative to the proposed treatment. I have also explained the risks and benefits involved in refusal of the proposed treatment and  alternatives to the proposed treatment and have answered the patient's questions. If I have a significant financial interest in a co-management agreement or a significant financial interest in any product or implant, or other significant relationship used in this procedure/surgery, I have disclosed this and had a discussion with my patient.     _____________________________________________________              _____________________________  (Signature of Physician)                                                                                         (Date)                                   (Time)  Patient Name: Preston Casey      : 1995      Printed: 6/10/2024     Medical Record #: [id number]                                      Page 1 of 1